# Patient Record
Sex: MALE | Race: WHITE | NOT HISPANIC OR LATINO | URBAN - METROPOLITAN AREA
[De-identification: names, ages, dates, MRNs, and addresses within clinical notes are randomized per-mention and may not be internally consistent; named-entity substitution may affect disease eponyms.]

---

## 2017-02-14 ENCOUNTER — INPATIENT HOSPITAL (OUTPATIENT)
Dept: URBAN - METROPOLITAN AREA HOSPITAL 76 | Facility: HOSPITAL | Age: 52
End: 2017-02-14

## 2017-02-14 DIAGNOSIS — F10.10 ALCOHOL ABUSE, UNCOMPLICATED: ICD-10-CM

## 2017-02-14 DIAGNOSIS — R94.5 ABNORMAL RESULTS OF LIVER FUNCTION STUDIES: ICD-10-CM

## 2017-02-14 PROCEDURE — 99253 IP/OBS CNSLTJ NEW/EST LOW 45: CPT | Performed by: NURSE PRACTITIONER

## 2017-02-15 PROCEDURE — 99231 SBSQ HOSP IP/OBS SF/LOW 25: CPT | Performed by: NURSE PRACTITIONER

## 2018-09-27 ENCOUNTER — INPATIENT HOSPITAL (OUTPATIENT)
Dept: URBAN - METROPOLITAN AREA HOSPITAL 84 | Facility: HOSPITAL | Age: 53
End: 2018-09-27

## 2018-09-27 DIAGNOSIS — R11.0 NAUSEA: ICD-10-CM

## 2018-09-27 DIAGNOSIS — I21.4 NON-ST ELEVATION (NSTEMI) MYOCARDIAL INFARCTION: ICD-10-CM

## 2018-09-27 DIAGNOSIS — K59.00 CONSTIPATION, UNSPECIFIED: ICD-10-CM

## 2018-09-27 DIAGNOSIS — F10.21 ALCOHOL DEPENDENCE, IN REMISSION: ICD-10-CM

## 2018-09-27 PROCEDURE — 99222 1ST HOSP IP/OBS MODERATE 55: CPT | Performed by: NURSE PRACTITIONER

## 2018-10-22 ENCOUNTER — HOSPITAL ENCOUNTER (OUTPATIENT)
Dept: PREADMISSION TESTING | Facility: HOSPITAL | Age: 53
Discharge: HOME | End: 2018-10-22
Attending: INTERNAL MEDICINE | Admitting: INTERNAL MEDICINE

## 2018-10-22 LAB
ALBUMIN SERPL-MCNC: 4.1 G/DL (ref 3.5–4.8)
ALBUMIN/GLOB SERPL: 1.3 {RATIO} (ref 1–1.7)
ALP SERPL-CCNC: 84 IU/L (ref 32–91)
ALT SERPL-CCNC: 30 IU/L (ref 17–63)
ANION GAP SERPL CALC-SCNC: 12.8 MMOL/L (ref 10–20)
AST SERPL-CCNC: 21 IU/L (ref 15–41)
BASOPHILS # BLD AUTO: 0.1 10*3/UL (ref 0–0.2)
BASOPHILS NFR BLD AUTO: 1 % (ref 0–2)
BILIRUB SERPL-MCNC: 0.7 MG/DL (ref 0.3–1.2)
BUN SERPL-MCNC: 13 MG/DL (ref 8–20)
BUN/CREAT SERPL: 14.4 (ref 6.2–20.3)
CALCIUM SERPL-MCNC: 9.4 MG/DL (ref 8.9–10.3)
CHLORIDE SERPL-SCNC: 101 MMOL/L (ref 101–111)
CONV CO2: 29 MMOL/L (ref 22–32)
CONV TOTAL PROTEIN: 7.3 G/DL (ref 6.1–7.9)
CREAT UR-MCNC: 0.9 MG/DL (ref 0.7–1.2)
DIFFERENTIAL METHOD BLD: (no result)
EOSINOPHIL # BLD AUTO: 0.2 10*3/UL (ref 0–0.3)
EOSINOPHIL # BLD AUTO: 3 % (ref 0–3)
ERYTHROCYTE [DISTWIDTH] IN BLOOD BY AUTOMATED COUNT: 12.9 % (ref 11.5–14.5)
GLOBULIN UR ELPH-MCNC: 3.2 G/DL (ref 2.5–3.8)
GLUCOSE SERPL-MCNC: 98 MG/DL (ref 65–99)
HCT VFR BLD AUTO: 43.6 % (ref 40–54)
HGB BLD-MCNC: 14.9 G/DL (ref 14–18)
INR PPP: 1.3 (ref 2–3)
LYMPHOCYTES # BLD AUTO: 2.8 10*3/UL (ref 0.8–4.8)
LYMPHOCYTES NFR BLD AUTO: 32 % (ref 18–42)
MCH RBC QN AUTO: 31.8 PG (ref 26–32)
MCHC RBC AUTO-ENTMCNC: 34.2 G/DL (ref 32–36)
MCV RBC AUTO: 92.8 FL (ref 80–94)
MONOCYTES # BLD AUTO: 1.1 10*3/UL (ref 0.1–1.3)
MONOCYTES NFR BLD AUTO: 12 % (ref 2–11)
NEUTROPHILS # BLD AUTO: 4.7 10*3/UL (ref 2.3–8.6)
NEUTROPHILS NFR BLD AUTO: 52 % (ref 50–75)
NRBC BLD AUTO-RTO: 0 /100{WBCS}
NRBC/RBC NFR BLD MANUAL: 0 10*3/UL
PLATELET # BLD AUTO: 306 10*3/UL (ref 150–450)
PMV BLD AUTO: 7.6 FL (ref 7.4–10.4)
POTASSIUM SERPL-SCNC: 4.8 MMOL/L (ref 3.6–5.1)
PROTHROMBIN TIME: 13 SEC (ref 19.4–28.5)
RBC # BLD AUTO: 4.7 10*6/UL (ref 4.6–6)
SODIUM SERPL-SCNC: 138 MMOL/L (ref 136–144)
WBC # BLD AUTO: 8.8 10*3/UL (ref 4.5–11.5)

## 2019-06-18 RX ORDER — WARFARIN SODIUM 5 MG/1
TABLET ORAL
Qty: 40 TABLET | Refills: 2 | Status: SHIPPED | OUTPATIENT
Start: 2019-06-18 | End: 2019-06-27

## 2019-06-27 ENCOUNTER — OFFICE VISIT (OUTPATIENT)
Dept: CARDIOLOGY | Facility: CLINIC | Age: 54
End: 2019-06-27

## 2019-06-27 ENCOUNTER — ANTICOAGULATION VISIT (OUTPATIENT)
Dept: CARDIOLOGY | Facility: CLINIC | Age: 54
End: 2019-06-27

## 2019-06-27 VITALS — DIASTOLIC BLOOD PRESSURE: 86 MMHG | WEIGHT: 230 LBS | SYSTOLIC BLOOD PRESSURE: 130 MMHG | HEART RATE: 62 BPM

## 2019-06-27 DIAGNOSIS — R42 DIZZY SPELLS: ICD-10-CM

## 2019-06-27 DIAGNOSIS — I71.40 AAA (ABDOMINAL AORTIC ANEURYSM) WITHOUT RUPTURE (HCC): ICD-10-CM

## 2019-06-27 DIAGNOSIS — R00.2 PALPITATIONS: Primary | ICD-10-CM

## 2019-06-27 DIAGNOSIS — Z79.01 LONG TERM CURRENT USE OF ANTICOAGULANT THERAPY: ICD-10-CM

## 2019-06-27 DIAGNOSIS — I10 ESSENTIAL HYPERTENSION: ICD-10-CM

## 2019-06-27 DIAGNOSIS — E78.5 HYPERLIPIDEMIA, UNSPECIFIED HYPERLIPIDEMIA TYPE: ICD-10-CM

## 2019-06-27 DIAGNOSIS — I25.41 ANEURYSM (ARTERIOVENOUS) OF CORONARY VESSELS: Primary | ICD-10-CM

## 2019-06-27 DIAGNOSIS — I25.2 HISTORY OF MYOCARDIAL INFARCTION: ICD-10-CM

## 2019-06-27 DIAGNOSIS — I25.118 CORONARY ARTERY DISEASE OF NATIVE HEART WITH STABLE ANGINA PECTORIS, UNSPECIFIED VESSEL OR LESION TYPE (HCC): ICD-10-CM

## 2019-06-27 LAB — INR PPP: 2.4 (ref 0.9–1.1)

## 2019-06-27 PROCEDURE — 85610 PROTHROMBIN TIME: CPT | Performed by: NURSE PRACTITIONER

## 2019-06-27 PROCEDURE — 36416 COLLJ CAPILLARY BLOOD SPEC: CPT | Performed by: NURSE PRACTITIONER

## 2019-06-27 PROCEDURE — 99213 OFFICE O/P EST LOW 20 MIN: CPT | Performed by: NURSE PRACTITIONER

## 2019-06-27 RX ORDER — WARFARIN SODIUM 5 MG/1
TABLET ORAL
COMMUNITY
Start: 2018-10-26 | End: 2019-09-19 | Stop reason: SDUPTHER

## 2019-06-27 RX ORDER — ATORVASTATIN CALCIUM 20 MG/1
TABLET, FILM COATED ORAL
Refills: 0 | COMMUNITY
Start: 2019-03-29 | End: 2019-12-27

## 2019-06-27 RX ORDER — ASPIRIN 81 MG/1
TABLET ORAL EVERY 24 HOURS
COMMUNITY
Start: 2018-10-02

## 2019-06-27 RX ORDER — AMLODIPINE BESYLATE 10 MG/1
TABLET ORAL
Refills: 0 | COMMUNITY
Start: 2019-04-25 | End: 2019-07-30 | Stop reason: SDUPTHER

## 2019-07-02 PROBLEM — I21.9 MYOCARDIAL INFARCTION (HCC): Status: ACTIVE | Noted: 2018-10-02

## 2019-07-02 PROBLEM — E78.5 HYPERLIPIDEMIA: Status: ACTIVE | Noted: 2018-10-02

## 2019-07-02 PROBLEM — I25.2 HISTORY OF MYOCARDIAL INFARCTION: Status: ACTIVE | Noted: 2018-10-02

## 2019-07-02 PROBLEM — I10 HYPERTENSION: Status: ACTIVE | Noted: 2018-10-02

## 2019-07-02 PROBLEM — I25.10 CORONARY HEART DISEASE: Status: ACTIVE | Noted: 2018-10-02

## 2019-07-02 PROBLEM — Z79.01 LONG TERM CURRENT USE OF ANTICOAGULANT THERAPY: Status: ACTIVE | Noted: 2018-10-04

## 2019-07-02 PROCEDURE — 93000 ELECTROCARDIOGRAM COMPLETE: CPT | Performed by: NURSE PRACTITIONER

## 2019-07-23 DIAGNOSIS — R00.2 PALPITATIONS: ICD-10-CM

## 2019-07-23 DIAGNOSIS — I25.41 ANEURYSM (ARTERIOVENOUS) OF CORONARY VESSELS: Primary | ICD-10-CM

## 2019-07-23 DIAGNOSIS — R42 DIZZY SPELLS: ICD-10-CM

## 2019-07-23 DIAGNOSIS — I25.2 HISTORY OF MYOCARDIAL INFARCTION: ICD-10-CM

## 2019-07-30 RX ORDER — AMLODIPINE BESYLATE 10 MG/1
10 TABLET ORAL DAILY
Qty: 90 TABLET | Refills: 2 | Status: SHIPPED | OUTPATIENT
Start: 2019-07-30 | End: 2020-04-22

## 2019-08-06 ENCOUNTER — ANTICOAGULATION VISIT (OUTPATIENT)
Dept: CARDIOLOGY | Facility: CLINIC | Age: 54
End: 2019-08-06

## 2019-08-06 DIAGNOSIS — Z86.79: Primary | ICD-10-CM

## 2019-08-06 LAB — INR PPP: 2.5 (ref 0.9–1.1)

## 2019-08-06 PROCEDURE — 85610 PROTHROMBIN TIME: CPT | Performed by: INTERNAL MEDICINE

## 2019-08-06 PROCEDURE — 36416 COLLJ CAPILLARY BLOOD SPEC: CPT | Performed by: INTERNAL MEDICINE

## 2019-08-09 ENCOUNTER — APPOINTMENT (OUTPATIENT)
Dept: CARDIOLOGY | Facility: HOSPITAL | Age: 54
End: 2019-08-09

## 2019-09-03 ENCOUNTER — ANTICOAGULATION VISIT (OUTPATIENT)
Dept: CARDIOLOGY | Facility: CLINIC | Age: 54
End: 2019-09-03

## 2019-09-03 DIAGNOSIS — I48.91 ATRIAL FIBRILLATION, UNSPECIFIED TYPE (HCC): Primary | ICD-10-CM

## 2019-09-03 LAB — INR PPP: 1.9 (ref 0.9–1.1)

## 2019-09-03 PROCEDURE — 85610 PROTHROMBIN TIME: CPT | Performed by: INTERNAL MEDICINE

## 2019-09-03 PROCEDURE — 36416 COLLJ CAPILLARY BLOOD SPEC: CPT | Performed by: INTERNAL MEDICINE

## 2019-09-19 RX ORDER — WARFARIN SODIUM 5 MG/1
TABLET ORAL
Qty: 40 TABLET | Refills: 0 | Status: SHIPPED | OUTPATIENT
Start: 2019-09-19 | End: 2019-10-22 | Stop reason: SDUPTHER

## 2019-10-08 ENCOUNTER — ANTICOAGULATION VISIT (OUTPATIENT)
Dept: CARDIOLOGY | Facility: CLINIC | Age: 54
End: 2019-10-08

## 2019-10-08 DIAGNOSIS — I25.41 ANEURYSM OF CORONARY VESSELS: Primary | ICD-10-CM

## 2019-10-08 LAB — INR PPP: 2 (ref 0.9–1.1)

## 2019-10-08 PROCEDURE — 36416 COLLJ CAPILLARY BLOOD SPEC: CPT | Performed by: INTERNAL MEDICINE

## 2019-10-08 PROCEDURE — 85610 PROTHROMBIN TIME: CPT | Performed by: INTERNAL MEDICINE

## 2019-10-22 RX ORDER — WARFARIN SODIUM 5 MG/1
TABLET ORAL
Qty: 40 TABLET | Refills: 5 | Status: SHIPPED | OUTPATIENT
Start: 2019-10-22 | End: 2020-01-28 | Stop reason: DRUGHIGH

## 2019-11-05 ENCOUNTER — ANTICOAGULATION VISIT (OUTPATIENT)
Dept: CARDIOLOGY | Facility: CLINIC | Age: 54
End: 2019-11-05

## 2019-11-05 DIAGNOSIS — I25.41 ANEURYSM OF CORONARY VESSELS: Primary | ICD-10-CM

## 2019-11-05 LAB — INR PPP: 2.2 (ref 0.9–1.1)

## 2019-11-05 PROCEDURE — 85610 PROTHROMBIN TIME: CPT | Performed by: INTERNAL MEDICINE

## 2019-11-05 PROCEDURE — 36416 COLLJ CAPILLARY BLOOD SPEC: CPT | Performed by: INTERNAL MEDICINE

## 2019-12-03 ENCOUNTER — ANTICOAGULATION VISIT (OUTPATIENT)
Dept: CARDIOLOGY | Facility: CLINIC | Age: 54
End: 2019-12-03

## 2019-12-03 DIAGNOSIS — I25.41 ANEURYSM OF CORONARY VESSELS: Primary | ICD-10-CM

## 2019-12-03 LAB — INR PPP: 1.8 (ref 0.9–1.1)

## 2019-12-03 PROCEDURE — 36416 COLLJ CAPILLARY BLOOD SPEC: CPT | Performed by: INTERNAL MEDICINE

## 2019-12-03 PROCEDURE — 85610 PROTHROMBIN TIME: CPT | Performed by: INTERNAL MEDICINE

## 2019-12-27 RX ORDER — ATORVASTATIN CALCIUM 20 MG/1
TABLET, FILM COATED ORAL
Qty: 90 TABLET | Refills: 1 | Status: SHIPPED | OUTPATIENT
Start: 2019-12-27 | End: 2020-06-25

## 2019-12-31 ENCOUNTER — ANTICOAGULATION VISIT (OUTPATIENT)
Dept: CARDIOLOGY | Facility: CLINIC | Age: 54
End: 2019-12-31

## 2019-12-31 DIAGNOSIS — I25.41 ANEURYSM OF CORONARY VESSELS: Primary | ICD-10-CM

## 2019-12-31 LAB — INR PPP: 1.6 (ref 0.9–1.1)

## 2019-12-31 PROCEDURE — 85610 PROTHROMBIN TIME: CPT | Performed by: NURSE PRACTITIONER

## 2019-12-31 PROCEDURE — 36416 COLLJ CAPILLARY BLOOD SPEC: CPT | Performed by: NURSE PRACTITIONER

## 2020-01-28 ENCOUNTER — ANTICOAGULATION VISIT (OUTPATIENT)
Dept: CARDIOLOGY | Facility: CLINIC | Age: 55
End: 2020-01-28

## 2020-01-28 DIAGNOSIS — I25.41 ANEURYSM OF CORONARY VESSELS: Primary | ICD-10-CM

## 2020-01-28 LAB — INR PPP: 1.9 (ref 0.9–1.1)

## 2020-01-28 PROCEDURE — 36416 COLLJ CAPILLARY BLOOD SPEC: CPT | Performed by: INTERNAL MEDICINE

## 2020-01-28 PROCEDURE — 85610 PROTHROMBIN TIME: CPT | Performed by: INTERNAL MEDICINE

## 2020-01-28 RX ORDER — WARFARIN SODIUM 5 MG/1
TABLET ORAL
Qty: 135 TABLET | Refills: 1 | Status: SHIPPED | OUTPATIENT
Start: 2020-01-28 | End: 2020-08-18

## 2020-02-06 ENCOUNTER — OFFICE VISIT (OUTPATIENT)
Dept: CARDIOLOGY | Facility: CLINIC | Age: 55
End: 2020-02-06

## 2020-02-06 VITALS
OXYGEN SATURATION: 100 % | WEIGHT: 243 LBS | HEIGHT: 69 IN | BODY MASS INDEX: 35.99 KG/M2 | DIASTOLIC BLOOD PRESSURE: 90 MMHG | SYSTOLIC BLOOD PRESSURE: 144 MMHG | HEART RATE: 67 BPM

## 2020-02-06 DIAGNOSIS — E78.5 HYPERLIPIDEMIA, UNSPECIFIED HYPERLIPIDEMIA TYPE: ICD-10-CM

## 2020-02-06 DIAGNOSIS — R60.0 BILATERAL LEG EDEMA: ICD-10-CM

## 2020-02-06 DIAGNOSIS — I25.10 CORONARY ARTERY DISEASE INVOLVING NATIVE HEART WITHOUT ANGINA PECTORIS, UNSPECIFIED VESSEL OR LESION TYPE: ICD-10-CM

## 2020-02-06 DIAGNOSIS — I10 ESSENTIAL HYPERTENSION: ICD-10-CM

## 2020-02-06 DIAGNOSIS — R06.02 SHORTNESS OF BREATH: ICD-10-CM

## 2020-02-06 DIAGNOSIS — I25.41 ANEURYSM OF CORONARY VESSELS: ICD-10-CM

## 2020-02-06 DIAGNOSIS — R00.2 PALPITATIONS: Primary | ICD-10-CM

## 2020-02-06 DIAGNOSIS — I21.A1 TYPE 2 MYOCARDIAL INFARCTION (HCC): ICD-10-CM

## 2020-02-06 DIAGNOSIS — Z79.01 LONG TERM CURRENT USE OF ANTICOAGULANT THERAPY: ICD-10-CM

## 2020-02-06 PROCEDURE — 99214 OFFICE O/P EST MOD 30 MIN: CPT | Performed by: NURSE PRACTITIONER

## 2020-02-06 PROCEDURE — 93000 ELECTROCARDIOGRAM COMPLETE: CPT | Performed by: NURSE PRACTITIONER

## 2020-02-06 NOTE — PROGRESS NOTES
"  [facility] CARDIOLOGY      REASON FOR FOLLOW-UP:  History of RCA dissection  Coagulopathy on warfarin  Hypertension  Dyslipidemia          Chief Complaint   Patient presents with   • Coronary Artery Disease     6 month f/u  Occasional dizziness.   • Hypertension         Dear        History of Present Illness   54-year-old  male with history of non ST-elevation myocardial infarction. Patient had spontaneous dissection of the RCA.  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  He was discharged home on Lovenox/warfarin.  He has additional risk factors that include hypertension, dyslipidemia and prior tobacco use.  At his last office visit, the patient reported that he was having symptoms of dizziness, occasional palpitations and at times he \"just does not feel right\".   He denieed any actual chest pains, pressure or tightness.  He denied any presyncopal or syncopal episodes.  He had some occasional, mild lower extremity edema.  He reported no shortness of air with exertion.    2D echo was ordered, however this was not approved by his insurance carrier.  Patient reports today that he continues to have these symptoms.  His blood pressure is elevated at 144/90.      Impression     1. Spontaneous dissection of right coronary artery   2. Non ST-elevation myocardial infarction   3. coagulopathy with warfarin  4. former ETOH abuse  5. Former tobacco abuse  6. history of hypertension  7. History of dyslipidemia     Plan  Continue current medications  We will get 2D echocardiogram order approved  Follow-up after testing      The following portions of the patient's history were reviewed and updated as appropriate: allergies, current medications, past family history, past medical history, past social history, past surgical history and problem list.    REVIEW OF SYSTEMS:    Review of Systems   Cardiovascular: Positive for leg swelling and palpitations.   Neurological: " Positive for dizziness.   All other systems reviewed and are negative.      Vitals:    02/06/20 1328   BP: 144/90   Pulse: 67   SpO2: 100%         PHYSICAL EXAM:    General: Alert, cooperative, no distress, appears stated age  Head:  Normocephalic, atraumatic, mucous membranes moist  Eyes:  Conjunctiva/corneas clear, EOM's intact     Neck:  Supple,  no JVD or bruit     Lungs: Clear to auscultation bilaterally, no wheezes rhonchi rales are noted  Chest wall: No tenderness  Musculoskeletal:   Ambulates freely without assistance  Heart::  Regular rate and rhythm, S1 and S2 normal, no murmur, rub or gallop  Abdomen: Soft, non-tender, nondistended bowel sounds active, no abdominal bruit  Extremities: No cyanosis, clubbing, or edema   Pulses: 2+ and symmetric all extremities  Skin:  No rashes or lesions  Neuro/psych: A&O x3. CN II through XII are grossly intact with appropriate affect        Past Medical History:   Diagnosis Date   • Chest pain 09/24/2018    Associated w/Nausea/SOB---Seen @ [facility] ER   • Coronary heart disease 09/2018   • Family history of heart disease     Mom/Dad/Uncles   • History of EKG 09/24/18-[facility] ER    T-Wave Inversion in Inferior Leads   • Hx of chest x-ray 09/24/18-[facility] ER    WNL w/Possible Atelectasis or Pneumonia   • Hyperlipidemia    • Hypertension    • Ischemic heart disease    • NSTEMI (non-ST elevated myocardial infarction) (CMS/[facility]) 09/24/2018       Past Surgical History:   Procedure Laterality Date   • CARDIAC CATHETERIZATION Left 09/4/18[facility]    w/Selective Coronary Arteriography/Intra-Aortic Balloon Pump Placement-Dr. Henderson--See Report   • LEG SURGERY      Right leg surgery steel plate-screws         Current Outpatient Medications:   •  amLODIPine (NORVASC) 10 MG tablet, Take 1 tablet by mouth Daily., Disp: 90 tablet, Rfl: 2  •  aspirin (ADULT ASPIRIN EC LOW STRENGTH) 81 MG EC tablet, Daily., Disp: , Rfl:   •  atorvastatin (LIPITOR) 20 MG tablet, TAKE ONE TABLET BY MOUTH DAILY, Disp: 90  tablet, Rfl: 1  •  metoprolol tartrate (LOPRESSOR) 25 MG tablet, Take 1 tablet by mouth Every 12 (Twelve) Hours. Take one-half tab twice daily, Disp: 180 tablet, Rfl: 1  •  warfarin (COUMADIN) 5 MG tablet, Take one & one-half tablets daily or as directed., Disp: 135 tablet, Rfl: 1    No Known Allergies    Family History   Problem Relation Age of Onset   • Heart disease Mother    • Heart disease Father    • Heart disease Paternal Uncle        Social History     Tobacco Use   • Smoking status: Former Smoker     Types: Cigarettes   • Smokeless tobacco: Never Used   Substance Use Topics   • Alcohol use: No           Current Electrocardiogram:    ECG 12 Lead  Date/Time: 2/6/2020 12:13 PM  Performed by: Roro Corley APRN  Authorized by: Roro Corley APRN   Comparison: compared with previous ECG   Comparison to previous ECG: First-degree AV block new from prior    Old anterior wall MI as per prior  Rhythm: sinus rhythm  Conduction: 1st degree AV block    Clinical impression: abnormal EKG              SAMIA Trujillo  02/10/20  12:15 PM

## 2020-02-10 PROBLEM — R00.2 PALPITATIONS: Status: ACTIVE | Noted: 2020-02-10

## 2020-02-18 ENCOUNTER — ANTICOAGULATION VISIT (OUTPATIENT)
Dept: CARDIOLOGY | Facility: CLINIC | Age: 55
End: 2020-02-18

## 2020-02-18 DIAGNOSIS — I25.41 ANEURYSM OF CORONARY VESSELS: Primary | ICD-10-CM

## 2020-02-18 LAB — INR PPP: 2 (ref 0.9–1.1)

## 2020-02-18 PROCEDURE — 85610 PROTHROMBIN TIME: CPT | Performed by: INTERNAL MEDICINE

## 2020-02-18 PROCEDURE — 36416 COLLJ CAPILLARY BLOOD SPEC: CPT | Performed by: INTERNAL MEDICINE

## 2020-03-17 ENCOUNTER — ANTICOAGULATION VISIT (OUTPATIENT)
Dept: CARDIOLOGY | Facility: CLINIC | Age: 55
End: 2020-03-17

## 2020-03-17 DIAGNOSIS — I25.41 ANEURYSM OF CORONARY VESSELS: Primary | ICD-10-CM

## 2020-03-17 LAB — INR PPP: 2.8 (ref 0.9–1.1)

## 2020-03-17 PROCEDURE — 36416 COLLJ CAPILLARY BLOOD SPEC: CPT | Performed by: INTERNAL MEDICINE

## 2020-03-17 PROCEDURE — 85610 PROTHROMBIN TIME: CPT | Performed by: INTERNAL MEDICINE

## 2020-04-14 ENCOUNTER — ANTICOAGULATION VISIT (OUTPATIENT)
Dept: CARDIOLOGY | Facility: CLINIC | Age: 55
End: 2020-04-14

## 2020-04-14 DIAGNOSIS — I25.41 ANEURYSM OF CORONARY VESSELS: Primary | ICD-10-CM

## 2020-04-14 LAB — INR PPP: 2.6 (ref 0.9–1.1)

## 2020-04-14 PROCEDURE — 36416 COLLJ CAPILLARY BLOOD SPEC: CPT | Performed by: INTERNAL MEDICINE

## 2020-04-14 PROCEDURE — 85610 PROTHROMBIN TIME: CPT | Performed by: INTERNAL MEDICINE

## 2020-04-22 RX ORDER — AMLODIPINE BESYLATE 10 MG/1
TABLET ORAL
Qty: 90 TABLET | Refills: 1 | Status: SHIPPED | OUTPATIENT
Start: 2020-04-22 | End: 2020-10-21

## 2020-05-12 ENCOUNTER — ANTICOAGULATION VISIT (OUTPATIENT)
Dept: CARDIOLOGY | Facility: CLINIC | Age: 55
End: 2020-05-12

## 2020-05-12 DIAGNOSIS — I25.41 ANEURYSM OF CORONARY VESSELS: Primary | ICD-10-CM

## 2020-05-12 LAB — INR PPP: 2.5 (ref 0.9–1.1)

## 2020-05-12 PROCEDURE — 36416 COLLJ CAPILLARY BLOOD SPEC: CPT | Performed by: INTERNAL MEDICINE

## 2020-05-12 PROCEDURE — 85610 PROTHROMBIN TIME: CPT | Performed by: INTERNAL MEDICINE

## 2020-06-09 ENCOUNTER — ANTICOAGULATION VISIT (OUTPATIENT)
Dept: CARDIOLOGY | Facility: CLINIC | Age: 55
End: 2020-06-09

## 2020-06-09 DIAGNOSIS — I25.41 ANEURYSM OF CORONARY VESSELS: Primary | ICD-10-CM

## 2020-06-09 LAB — INR PPP: 3.1 (ref 0.9–1.1)

## 2020-06-09 PROCEDURE — 85610 PROTHROMBIN TIME: CPT | Performed by: INTERNAL MEDICINE

## 2020-06-09 PROCEDURE — 36416 COLLJ CAPILLARY BLOOD SPEC: CPT | Performed by: INTERNAL MEDICINE

## 2020-06-25 RX ORDER — ATORVASTATIN CALCIUM 20 MG/1
TABLET, FILM COATED ORAL
Qty: 90 TABLET | Refills: 0 | Status: SHIPPED | OUTPATIENT
Start: 2020-06-25 | End: 2020-09-23

## 2020-07-07 ENCOUNTER — ANTICOAGULATION VISIT (OUTPATIENT)
Dept: CARDIOLOGY | Facility: CLINIC | Age: 55
End: 2020-07-07

## 2020-07-07 DIAGNOSIS — I25.41 ANEURYSM OF CORONARY VESSELS: Primary | ICD-10-CM

## 2020-07-07 LAB — INR PPP: 2 (ref 0.9–1.1)

## 2020-07-07 PROCEDURE — 36416 COLLJ CAPILLARY BLOOD SPEC: CPT | Performed by: INTERNAL MEDICINE

## 2020-07-07 PROCEDURE — 85610 PROTHROMBIN TIME: CPT | Performed by: INTERNAL MEDICINE

## 2020-08-04 ENCOUNTER — ANTICOAGULATION VISIT (OUTPATIENT)
Dept: CARDIOLOGY | Facility: CLINIC | Age: 55
End: 2020-08-04

## 2020-08-04 DIAGNOSIS — I25.41 ANEURYSM OF CORONARY VESSELS: Primary | ICD-10-CM

## 2020-08-04 LAB — INR PPP: 3.6 (ref 0.9–1.1)

## 2020-08-04 PROCEDURE — 36416 COLLJ CAPILLARY BLOOD SPEC: CPT | Performed by: INTERNAL MEDICINE

## 2020-08-04 PROCEDURE — 85610 PROTHROMBIN TIME: CPT | Performed by: INTERNAL MEDICINE

## 2020-08-06 ENCOUNTER — OFFICE VISIT (OUTPATIENT)
Dept: CARDIOLOGY | Facility: CLINIC | Age: 55
End: 2020-08-06

## 2020-08-06 VITALS
OXYGEN SATURATION: 96 % | BODY MASS INDEX: 34.26 KG/M2 | WEIGHT: 232 LBS | DIASTOLIC BLOOD PRESSURE: 88 MMHG | HEART RATE: 56 BPM | SYSTOLIC BLOOD PRESSURE: 133 MMHG

## 2020-08-06 DIAGNOSIS — I21.A1 TYPE 2 MYOCARDIAL INFARCTION (HCC): ICD-10-CM

## 2020-08-06 DIAGNOSIS — I25.10 CORONARY ARTERY DISEASE INVOLVING NATIVE CORONARY ARTERY OF NATIVE HEART WITHOUT ANGINA PECTORIS: Primary | ICD-10-CM

## 2020-08-06 DIAGNOSIS — E78.2 MIXED HYPERLIPIDEMIA: ICD-10-CM

## 2020-08-06 DIAGNOSIS — R00.2 PALPITATIONS: ICD-10-CM

## 2020-08-06 DIAGNOSIS — I10 ESSENTIAL HYPERTENSION: ICD-10-CM

## 2020-08-06 DIAGNOSIS — Z79.01 LONG TERM CURRENT USE OF ANTICOAGULANT THERAPY: ICD-10-CM

## 2020-08-06 PROCEDURE — 93000 ELECTROCARDIOGRAM COMPLETE: CPT | Performed by: NURSE PRACTITIONER

## 2020-08-06 PROCEDURE — 99214 OFFICE O/P EST MOD 30 MIN: CPT | Performed by: NURSE PRACTITIONER

## 2020-08-06 NOTE — PROGRESS NOTES
"  [facility] CARDIOLOGY      REASON FOR FOLLOW-UP:  History of RCA dissection  Coagulopathy on warfarin  Hypertension  Dyslipidemia          Chief Complaint   Patient presents with   • Coronary Artery Disease     6 mo f/u no cardiac complaints    • Hypertension   • Hyperlipidemia         History of Present Illness   55-year-old  male with history of non ST-elevation myocardial infarction secondary to dissection of the RCA.  Follow-up heart catheterization showed resolution of his aneurysmal RCA.  He was discharged home on Lovenox/warfarin.  He has additional risk factors that include hypertension, dyslipidemia and prior tobacco use.  At his last office visit, the patient reported that he was having symptoms of dizziness, occasional palpitations and at times he \"just does not feel right\".   He denied any actual chest pains, pressure or tightness.  He denied any presyncopal or syncopal episodes.  He had some occasional, mild lower extremity edema.  He reported no shortness of air with exertion.    2D echo was ordered, however this was not approved by his insurance carrier.    He presents today in follow-up for the above-mentioned diagnoses.    Today, patient reports that he feels well.  He does continue to feel tired due to working full-time and helping his parents at their home as well.  He denies any lower extremity edema, dyspnea with exertion.  He does report some mild intermittent dizziness without presyncopal or syncopal episodes.  EKG in the office today shows sinus bradycardia.  Blood pressure is well controlled at 133/88.      Assessment:  History of spontaneous dissection of the RCA  Non-ST elevation MI  Former EtOH abuse  Coagulopathy on warfarin  Dizziness  Hypertension  Dyslipidemia  Prior tobacco use    Plan:  I would still like to obtain echocardiogram.  We will send order again.  Discuss with cardiologist duration of anticoagulant therapy.    Due to aneurysmal artery " and slow arterial flow, patient will need life-long anticoagulation therapy.  Follow-up in 6 months or sooner if needed        The following portions of the patient's history were reviewed and updated as appropriate: allergies, current medications, past family history, past medical history, past social history, past surgical history and problem list.    REVIEW OF SYSTEMS:    Review of Systems   Constitution: Positive for malaise/fatigue.   Neurological: Positive for dizziness.   All other systems reviewed and are negative.      Vitals:    08/06/20 1318   BP: 133/88   Pulse: 56   SpO2: 96%         PHYSICAL EXAM:    General: Alert, cooperative, no distress, appears stated age  Head:  Normocephalic, atraumatic, mucous membranes moist  Eyes:  Conjunctiva/corneas clear, EOM's intact     Neck:  Supple,  no JVD or bruit     Lungs: Clear to auscultation bilaterally, no wheezes rhonchi rales are noted  Chest wall: No tenderness  Musculoskeletal:   Ambulates freely without assistance  Heart::  Regular rate and rhythm, S1 and S2 normal, no murmur, rub or gallop  Abdomen: Soft, non-tender, nondistended, bowel sounds active, no abdominal bruit  Extremities: No cyanosis, clubbing, or edema   Pulses: 2+ and symmetric all extremities  Skin:  No rashes or lesions  Neuro/psych: A&O x3. CN II through XII are grossly intact with appropriate affect        Past Medical History:   Diagnosis Date   • Chest pain 09/24/2018    Associated w/Nausea/SOB---Seen @ [facility] ER   • Coronary heart disease 09/2018   • Family history of heart disease     Mom/Dad/Uncles   • History of EKG 09/24/18-[facility] ER    T-Wave Inversion in Inferior Leads   • Hx of chest x-ray 09/24/18-[facility] ER    WNL w/Possible Atelectasis or Pneumonia   • Hyperlipidemia    • Hypertension    • Ischemic heart disease    • NSTEMI (non-ST elevated myocardial infarction) (CMS/[facility]) 09/24/2018       Past Surgical History:   Procedure Laterality Date   • CARDIAC CATHETERIZATION Left 09/4/18-[facility]  "   w/Selective Coronary Arteriography/Intra-Aortic Balloon Pump Placement-Dr. Henderson--See Report   • LEG SURGERY      Right leg surgery steel plate-screws         Current Outpatient Medications:   •  amLODIPine (NORVASC) 10 MG tablet, TAKE ONE TABLET BY MOUTH DAILY, Disp: 90 tablet, Rfl: 1  •  aspirin (ADULT ASPIRIN EC LOW STRENGTH) 81 MG EC tablet, Daily., Disp: , Rfl:   •  atorvastatin (LIPITOR) 20 MG tablet, TAKE ONE TABLET BY MOUTH DAILY, Disp: 90 tablet, Rfl: 0  •  metoprolol tartrate (LOPRESSOR) 25 MG tablet, TAKE ONE TABLET BY MOUTH EVERY 12 HOURS, Disp: 180 tablet, Rfl: 0  •  warfarin (COUMADIN) 5 MG tablet, Take one & one-half tablets daily or as directed., Disp: 135 tablet, Rfl: 1    No Known Allergies    Family History   Problem Relation Age of Onset   • Heart disease Mother    • Heart disease Father    • Heart disease Paternal Uncle        Social History     Tobacco Use   • Smoking status: Former Smoker     Types: Cigarettes   • Smokeless tobacco: Never Used   Substance Use Topics   • Alcohol use: No           Current Electrocardiogram:    ECG 12 Lead  Date/Time: 8/6/2020 3:10 PM  Performed by: Roro Corley APRN  Authorized by: Roro Corley APRN   Comparison: not compared with previous ECG   Rhythm: sinus bradycardia  BPM: 56  Q waves: V1 and V2                  SAMIA Trujillo  08/07/20  15:09      EMR Dragon/Transcription:   \"Dictated utilizing Dragon dictation\".         "

## 2020-08-07 DIAGNOSIS — I25.2 HISTORY OF MYOCARDIAL INFARCTION: ICD-10-CM

## 2020-08-07 DIAGNOSIS — I10 ESSENTIAL HYPERTENSION: ICD-10-CM

## 2020-08-07 DIAGNOSIS — I25.10 CORONARY ARTERY DISEASE INVOLVING NATIVE CORONARY ARTERY OF NATIVE HEART WITHOUT ANGINA PECTORIS: Primary | ICD-10-CM

## 2020-08-07 DIAGNOSIS — I25.42 CORONARY ARTERY DISSECTION: ICD-10-CM

## 2020-08-07 DIAGNOSIS — I21.A1 TYPE 2 MYOCARDIAL INFARCTION (HCC): ICD-10-CM

## 2020-08-18 RX ORDER — WARFARIN SODIUM 5 MG/1
TABLET ORAL
Qty: 135 TABLET | Refills: 0 | Status: SHIPPED | OUTPATIENT
Start: 2020-08-18 | End: 2020-11-13

## 2020-09-01 ENCOUNTER — ANTICOAGULATION VISIT (OUTPATIENT)
Dept: CARDIOLOGY | Facility: CLINIC | Age: 55
End: 2020-09-01

## 2020-09-01 DIAGNOSIS — I25.41 ANEURYSM OF CORONARY VESSELS: Primary | ICD-10-CM

## 2020-09-01 LAB — INR PPP: 2.7 (ref 0.9–1.1)

## 2020-09-01 PROCEDURE — 85610 PROTHROMBIN TIME: CPT | Performed by: INTERNAL MEDICINE

## 2020-09-01 PROCEDURE — 36416 COLLJ CAPILLARY BLOOD SPEC: CPT | Performed by: INTERNAL MEDICINE

## 2020-09-23 RX ORDER — ATORVASTATIN CALCIUM 20 MG/1
TABLET, FILM COATED ORAL
Qty: 90 TABLET | Refills: 0 | Status: SHIPPED | OUTPATIENT
Start: 2020-09-23 | End: 2020-12-21

## 2020-09-29 ENCOUNTER — ANTICOAGULATION VISIT (OUTPATIENT)
Dept: CARDIOLOGY | Facility: CLINIC | Age: 55
End: 2020-09-29

## 2020-09-29 DIAGNOSIS — I25.41 ANEURYSM OF CORONARY VESSELS: Primary | ICD-10-CM

## 2020-09-29 LAB — INR PPP: 2.1 (ref 0.9–1.1)

## 2020-09-29 PROCEDURE — 36416 COLLJ CAPILLARY BLOOD SPEC: CPT | Performed by: INTERNAL MEDICINE

## 2020-09-29 PROCEDURE — 85610 PROTHROMBIN TIME: CPT | Performed by: INTERNAL MEDICINE

## 2020-10-22 RX ORDER — AMLODIPINE BESYLATE 10 MG/1
TABLET ORAL
Qty: 90 TABLET | Refills: 3 | Status: SHIPPED | OUTPATIENT
Start: 2020-10-22 | End: 2021-10-18

## 2020-10-27 ENCOUNTER — ANTICOAGULATION VISIT (OUTPATIENT)
Dept: CARDIOLOGY | Facility: CLINIC | Age: 55
End: 2020-10-27

## 2020-10-27 DIAGNOSIS — I25.41 ANEURYSM OF CORONARY VESSELS: Primary | ICD-10-CM

## 2020-10-27 LAB — INR PPP: 1.7 (ref 0.9–1.1)

## 2020-10-27 PROCEDURE — 36416 COLLJ CAPILLARY BLOOD SPEC: CPT | Performed by: INTERNAL MEDICINE

## 2020-10-27 PROCEDURE — 85610 PROTHROMBIN TIME: CPT | Performed by: INTERNAL MEDICINE

## 2020-11-13 RX ORDER — WARFARIN SODIUM 5 MG/1
TABLET ORAL
Qty: 135 TABLET | Refills: 0 | Status: SHIPPED | OUTPATIENT
Start: 2020-11-13 | End: 2021-02-01

## 2020-11-17 ENCOUNTER — ANTICOAGULATION VISIT (OUTPATIENT)
Dept: CARDIOLOGY | Facility: CLINIC | Age: 55
End: 2020-11-17

## 2020-11-17 DIAGNOSIS — I25.41 ANEURYSM OF CORONARY VESSELS: Primary | ICD-10-CM

## 2020-11-17 LAB — INR PPP: 1.7 (ref 0.9–1.1)

## 2020-11-17 PROCEDURE — 85610 PROTHROMBIN TIME: CPT | Performed by: INTERNAL MEDICINE

## 2020-11-17 PROCEDURE — 36416 COLLJ CAPILLARY BLOOD SPEC: CPT | Performed by: INTERNAL MEDICINE

## 2020-12-08 ENCOUNTER — ANTICOAGULATION VISIT (OUTPATIENT)
Dept: CARDIOLOGY | Facility: CLINIC | Age: 55
End: 2020-12-08

## 2020-12-08 DIAGNOSIS — I25.41 ANEURYSM OF CORONARY VESSELS: Primary | ICD-10-CM

## 2020-12-08 LAB — INR PPP: 1.9 (ref 0.9–1.1)

## 2020-12-08 PROCEDURE — 85610 PROTHROMBIN TIME: CPT | Performed by: INTERNAL MEDICINE

## 2020-12-08 PROCEDURE — 36416 COLLJ CAPILLARY BLOOD SPEC: CPT | Performed by: INTERNAL MEDICINE

## 2020-12-21 RX ORDER — ATORVASTATIN CALCIUM 20 MG/1
TABLET, FILM COATED ORAL
Qty: 90 TABLET | Refills: 0 | Status: SHIPPED | OUTPATIENT
Start: 2020-12-21 | End: 2021-03-22

## 2021-01-05 ENCOUNTER — ANTICOAGULATION VISIT (OUTPATIENT)
Dept: CARDIOLOGY | Facility: CLINIC | Age: 56
End: 2021-01-05

## 2021-01-05 DIAGNOSIS — I25.41 ANEURYSM OF CORONARY VESSELS: Primary | ICD-10-CM

## 2021-01-05 LAB — INR PPP: 1.8 (ref 0.9–1.1)

## 2021-01-05 PROCEDURE — 85610 PROTHROMBIN TIME: CPT | Performed by: INTERNAL MEDICINE

## 2021-01-05 PROCEDURE — 36416 COLLJ CAPILLARY BLOOD SPEC: CPT | Performed by: INTERNAL MEDICINE

## 2021-01-26 ENCOUNTER — ANTICOAGULATION VISIT (OUTPATIENT)
Dept: CARDIOLOGY | Facility: CLINIC | Age: 56
End: 2021-01-26

## 2021-01-26 DIAGNOSIS — I25.41 ANEURYSM OF CORONARY VESSELS: Primary | ICD-10-CM

## 2021-01-26 LAB — INR PPP: 2.3 (ref 0.9–1.1)

## 2021-01-26 PROCEDURE — 85610 PROTHROMBIN TIME: CPT | Performed by: INTERNAL MEDICINE

## 2021-01-26 PROCEDURE — 36416 COLLJ CAPILLARY BLOOD SPEC: CPT | Performed by: INTERNAL MEDICINE

## 2021-02-01 RX ORDER — WARFARIN SODIUM 5 MG/1
TABLET ORAL
Qty: 135 TABLET | Refills: 0 | Status: SHIPPED | OUTPATIENT
Start: 2021-02-01 | End: 2021-04-13

## 2021-02-08 ENCOUNTER — OFFICE VISIT (OUTPATIENT)
Dept: CARDIOLOGY | Facility: CLINIC | Age: 56
End: 2021-02-08

## 2021-02-08 VITALS
SYSTOLIC BLOOD PRESSURE: 132 MMHG | DIASTOLIC BLOOD PRESSURE: 86 MMHG | BODY MASS INDEX: 20.82 KG/M2 | HEART RATE: 66 BPM | WEIGHT: 141 LBS

## 2021-02-08 DIAGNOSIS — I25.10 CORONARY ARTERY DISEASE INVOLVING NATIVE CORONARY ARTERY OF NATIVE HEART WITHOUT ANGINA PECTORIS: ICD-10-CM

## 2021-02-08 DIAGNOSIS — Z79.01 LONG TERM CURRENT USE OF ANTICOAGULANT THERAPY: ICD-10-CM

## 2021-02-08 DIAGNOSIS — I25.42 SPONTANEOUS DISSECTION OF CORONARY ARTERY: Primary | ICD-10-CM

## 2021-02-08 DIAGNOSIS — I21.A1 TYPE 2 MYOCARDIAL INFARCTION (HCC): ICD-10-CM

## 2021-02-08 DIAGNOSIS — E78.2 MIXED HYPERLIPIDEMIA: ICD-10-CM

## 2021-02-08 DIAGNOSIS — I10 ESSENTIAL HYPERTENSION: ICD-10-CM

## 2021-02-08 DIAGNOSIS — I25.2 HISTORY OF MYOCARDIAL INFARCTION: ICD-10-CM

## 2021-02-08 PROCEDURE — 99213 OFFICE O/P EST LOW 20 MIN: CPT | Performed by: NURSE PRACTITIONER

## 2021-02-08 PROCEDURE — 93000 ELECTROCARDIOGRAM COMPLETE: CPT | Performed by: NURSE PRACTITIONER

## 2021-02-08 NOTE — PROGRESS NOTES
"  [facility] CARDIOLOGY      REASON FOR FOLLOW-UP:            Chief Complaint   Patient presents with   • Coronary Artery Disease     6 mo f/u  no cardiac complaints   • Hypertension   • Hyperlipidemia             History of Present Illness     55-year-old  male with history of non ST-elevation myocardial infarction secondary to dissection of the RCA.  Follow-up heart catheterization showed resolution of his aneurysmal RCA.  He was discharged home on Lovenox/warfarin.  He has additional risk factors that include hypertension, dyslipidemia and prior tobacco use.  At his last office visit, the patient reported that he was having symptoms of dizziness, occasional palpitations and at times he \"just does not feel right\".   He denied any actual chest pains, pressure or tightness.  He denied any presyncopal or syncopal episodes.  He had some occasional, mild lower extremity edema.  He reported no shortness of air with exertion.    2D echo was ordered, however this was not approved by his insurance carrier.    He presents today in follow-up for the above-mentioned diagnoses.     Today, patient reports that he feels well.  He denies any chest pain, pressure or tightness, he denies any lower extremity edema, dyspnea with exertion.  He does report some ongoing mild intermittent dizziness without presyncopal or syncopal episodes.  EKG in the office today shows sinus rhythm with first-degree AV block.  Blood pressure is well controlled at 132/86.    Assessment:  History of spontaneous dissection of the RCA  Non-ST elevation MI  Former EtOH abuse  Coagulopathy on warfarin  Dizziness  Hypertension  Dyslipidemia  Prior tobacco use        Plan:    Due to aneurysmal artery and slow arterial flow, patient will need life-long anticoagulation therapy.  Patient needs to establish primary care  Lipids  Follow-up in 6 months or sooner if needed        The following portions of the patient's history were " reviewed and updated as appropriate: allergies, current medications, past family history, past medical history, past social history, past surgical history and problem list.    REVIEW OF SYSTEMS:    Review of Systems   Neurological: Positive for dizziness.   All other systems reviewed and are negative.      Vitals:    02/08/21 1259   BP: 132/86   Pulse: 66         PHYSICAL EXAM:    General: Alert, cooperative, no distress, appears stated age  Head:  Normocephalic, atraumatic, mucous membranes moist  Eyes:  Conjunctiva/corneas clear, EOM's intact     Neck:  Supple,  no JVD or bruit     Lungs: Clear to auscultation bilaterally, no wheezes rhonchi rales are noted  Chest wall: No tenderness  Musculoskeletal:   Ambulates freely without assistance  Heart::  Regular rate and rhythm, S1 and S2 normal, no murmur, rub or gallop  Abdomen: Soft, non-tender, nondistended, bowel sounds active, no abdominal bruit  Extremities: No cyanosis, clubbing, or edema   Pulses: 2+ and symmetric all extremities  Skin:  No rashes or lesions  Neuro/psych: A&O x3. CN II through XII are grossly intact with appropriate affect        Past Medical History:   Diagnosis Date   • Chest pain 09/24/2018    Associated w/Nausea/SOB---Seen @ [facility] ER   • Coronary heart disease 09/2018   • Family history of heart disease     Mom/Dad/Uncles   • History of EKG 09/24/18-[facility] ER    T-Wave Inversion in Inferior Leads   • Hx of chest x-ray 09/24/18-[facility] ER    WNL w/Possible Atelectasis or Pneumonia   • Hyperlipidemia    • Hypertension    • Ischemic heart disease    • NSTEMI (non-ST elevated myocardial infarction) (CMS/[facility]) 09/24/2018       Past Surgical History:   Procedure Laterality Date   • CARDIAC CATHETERIZATION Left 09/4/18-[facility]    w/Selective Coronary Arteriography/Intra-Aortic Balloon Pump Placement-Dr. Henderson--See Report   • LEG SURGERY      Right leg surgery steel plate-screws         Current Outpatient Medications:   •  amLODIPine (NORVASC) 10 MG tablet,  "TAKE ONE TABLET BY MOUTH DAILY, Disp: 90 tablet, Rfl: 3  •  aspirin (ADULT ASPIRIN EC LOW STRENGTH) 81 MG EC tablet, Daily., Disp: , Rfl:   •  atorvastatin (LIPITOR) 20 MG tablet, TAKE ONE TABLET BY MOUTH DAILY, Disp: 90 tablet, Rfl: 0  •  metoprolol tartrate (LOPRESSOR) 25 MG tablet, TAKE ONE TABLET BY MOUTH EVERY 12 HOURS, Disp: 180 tablet, Rfl: 0  •  warfarin (COUMADIN) 5 MG tablet, TAKE ONE AND ONE-HALF TABLETS BY MOUTH DAILY, Disp: 135 tablet, Rfl: 0    No Known Allergies    Family History   Problem Relation Age of Onset   • Heart disease Mother    • Heart disease Father    • Heart disease Paternal Uncle        Social History     Tobacco Use   • Smoking status: Former Smoker     Types: Cigarettes   • Smokeless tobacco: Never Used   Substance Use Topics   • Alcohol use: No           Current Electrocardiogram:    ECG 12 Lead    Date/Time: 2/8/2021 11:41 AM  Performed by: Roro Corley APRN  Authorized by: Roro Corley APRN   Comparison: not compared with previous ECG   Rhythm: sinus rhythm  BPM: 66  Conduction: 1st degree AV block                EMR Dragon/Transcription:   \"Dictated utilizing Dragon dictation\".     Disclaimer: Please note that areas of this note were completed with computer voice recognition software.  Quite often unanticipated grammatical, syntax, homophones, and other interpretive errors are inadvertently transcribed by the computer software. Please excuse any errors that have escaped final proofreading    "

## 2021-02-09 PROBLEM — I25.42 SPONTANEOUS DISSECTION OF CORONARY ARTERY: Status: ACTIVE | Noted: 2021-02-09

## 2021-02-23 ENCOUNTER — LAB (OUTPATIENT)
Dept: CARDIOLOGY | Facility: CLINIC | Age: 56
End: 2021-02-23

## 2021-02-23 ENCOUNTER — ANTICOAGULATION VISIT (OUTPATIENT)
Dept: CARDIOLOGY | Facility: CLINIC | Age: 56
End: 2021-02-23

## 2021-02-23 DIAGNOSIS — I25.41 ANEURYSM OF CORONARY VESSELS: Primary | ICD-10-CM

## 2021-02-23 DIAGNOSIS — E78.5 DYSLIPIDEMIA: Primary | ICD-10-CM

## 2021-02-23 LAB
CHOLEST SERPL-MCNC: 127 MG/DL (ref 0–200)
HDLC SERPL QL: 2.1
HDLC SERPL-MCNC: 60 MG/DL (ref 40–60)
INR PPP: 3.1 (ref 0.9–1.1)
LDLC SERPL CALC-MCNC: 53 MG/DL (ref 0–100)
TRIGL SERPL-MCNC: 74 MG/DL (ref 0–150)

## 2021-02-23 PROCEDURE — 36416 COLLJ CAPILLARY BLOOD SPEC: CPT | Performed by: INTERNAL MEDICINE

## 2021-02-23 PROCEDURE — 80061 LIPID PANEL: CPT | Performed by: NURSE PRACTITIONER

## 2021-02-23 PROCEDURE — 85610 PROTHROMBIN TIME: CPT | Performed by: INTERNAL MEDICINE

## 2021-03-22 RX ORDER — ATORVASTATIN CALCIUM 20 MG/1
TABLET, FILM COATED ORAL
Qty: 90 TABLET | Refills: 0 | Status: SHIPPED | OUTPATIENT
Start: 2021-03-22 | End: 2021-06-21

## 2021-03-23 ENCOUNTER — ANTICOAGULATION VISIT (OUTPATIENT)
Dept: CARDIOLOGY | Facility: CLINIC | Age: 56
End: 2021-03-23

## 2021-03-23 DIAGNOSIS — I25.41 ANEURYSM OF CORONARY VESSELS: Primary | ICD-10-CM

## 2021-03-23 LAB — INR PPP: 3.3 (ref 0.9–1.1)

## 2021-03-23 PROCEDURE — 36416 COLLJ CAPILLARY BLOOD SPEC: CPT | Performed by: INTERNAL MEDICINE

## 2021-03-23 PROCEDURE — 85610 PROTHROMBIN TIME: CPT | Performed by: INTERNAL MEDICINE

## 2021-04-13 ENCOUNTER — ANTICOAGULATION VISIT (OUTPATIENT)
Dept: CARDIOLOGY | Facility: CLINIC | Age: 56
End: 2021-04-13

## 2021-04-13 DIAGNOSIS — I25.41 ANEURYSM OF CORONARY VESSELS: Primary | ICD-10-CM

## 2021-04-13 LAB — INR PPP: 3 (ref 0.9–1.1)

## 2021-04-13 PROCEDURE — 36416 COLLJ CAPILLARY BLOOD SPEC: CPT | Performed by: INTERNAL MEDICINE

## 2021-04-13 PROCEDURE — 85610 PROTHROMBIN TIME: CPT | Performed by: INTERNAL MEDICINE

## 2021-04-13 RX ORDER — WARFARIN SODIUM 5 MG/1
TABLET ORAL
Qty: 135 TABLET | Refills: 0 | Status: SHIPPED | OUTPATIENT
Start: 2021-04-13 | End: 2021-07-06

## 2021-05-11 ENCOUNTER — ANTICOAGULATION VISIT (OUTPATIENT)
Dept: CARDIOLOGY | Facility: CLINIC | Age: 56
End: 2021-05-11

## 2021-05-11 DIAGNOSIS — I25.41 ANEURYSM OF CORONARY VESSELS: Primary | ICD-10-CM

## 2021-05-11 LAB — INR PPP: 2.5 (ref 0.9–1.1)

## 2021-05-11 PROCEDURE — 85610 PROTHROMBIN TIME: CPT | Performed by: INTERNAL MEDICINE

## 2021-05-11 PROCEDURE — 36416 COLLJ CAPILLARY BLOOD SPEC: CPT | Performed by: INTERNAL MEDICINE

## 2021-06-08 ENCOUNTER — ANTICOAGULATION VISIT (OUTPATIENT)
Dept: CARDIOLOGY | Facility: CLINIC | Age: 56
End: 2021-06-08

## 2021-06-08 DIAGNOSIS — I25.41 ANEURYSM OF CORONARY VESSELS: Primary | ICD-10-CM

## 2021-06-08 LAB — INR PPP: 3 (ref 0.9–1.1)

## 2021-06-08 PROCEDURE — 36416 COLLJ CAPILLARY BLOOD SPEC: CPT | Performed by: INTERNAL MEDICINE

## 2021-06-08 PROCEDURE — 85610 PROTHROMBIN TIME: CPT | Performed by: INTERNAL MEDICINE

## 2021-06-21 RX ORDER — ATORVASTATIN CALCIUM 20 MG/1
TABLET, FILM COATED ORAL
Qty: 90 TABLET | Refills: 0 | Status: SHIPPED | OUTPATIENT
Start: 2021-06-21 | End: 2021-09-17

## 2021-06-29 ENCOUNTER — ANTICOAGULATION VISIT (OUTPATIENT)
Dept: CARDIOLOGY | Facility: CLINIC | Age: 56
End: 2021-06-29

## 2021-06-29 DIAGNOSIS — I25.41 ANEURYSM OF CORONARY VESSELS: Primary | ICD-10-CM

## 2021-06-29 LAB — INR PPP: 2.7 (ref 0.9–1.1)

## 2021-06-29 PROCEDURE — 85610 PROTHROMBIN TIME: CPT | Performed by: INTERNAL MEDICINE

## 2021-06-29 PROCEDURE — 36416 COLLJ CAPILLARY BLOOD SPEC: CPT | Performed by: INTERNAL MEDICINE

## 2021-07-06 RX ORDER — WARFARIN SODIUM 5 MG/1
TABLET ORAL
Qty: 150 TABLET | Refills: 1 | Status: SHIPPED | OUTPATIENT
Start: 2021-07-06 | End: 2022-01-14

## 2021-07-27 ENCOUNTER — ANTICOAGULATION VISIT (OUTPATIENT)
Dept: CARDIOLOGY | Facility: CLINIC | Age: 56
End: 2021-07-27

## 2021-07-27 DIAGNOSIS — I25.41 ANEURYSM OF CORONARY VESSELS: Primary | ICD-10-CM

## 2021-07-27 LAB — INR PPP: 2.8 (ref 0.9–1.1)

## 2021-07-27 PROCEDURE — 85610 PROTHROMBIN TIME: CPT | Performed by: INTERNAL MEDICINE

## 2021-07-27 PROCEDURE — 36416 COLLJ CAPILLARY BLOOD SPEC: CPT | Performed by: INTERNAL MEDICINE

## 2021-08-10 ENCOUNTER — OFFICE VISIT (OUTPATIENT)
Dept: CARDIOLOGY | Facility: CLINIC | Age: 56
End: 2021-08-10

## 2021-08-10 VITALS
DIASTOLIC BLOOD PRESSURE: 81 MMHG | WEIGHT: 249 LBS | SYSTOLIC BLOOD PRESSURE: 124 MMHG | BODY MASS INDEX: 36.77 KG/M2 | HEART RATE: 59 BPM | OXYGEN SATURATION: 97 %

## 2021-08-10 DIAGNOSIS — Z79.01 LONG TERM CURRENT USE OF ANTICOAGULANT THERAPY: ICD-10-CM

## 2021-08-10 DIAGNOSIS — E78.2 MIXED HYPERLIPIDEMIA: Primary | ICD-10-CM

## 2021-08-10 DIAGNOSIS — I25.42 SPONTANEOUS DISSECTION OF CORONARY ARTERY: ICD-10-CM

## 2021-08-10 DIAGNOSIS — I25.2 HISTORY OF MYOCARDIAL INFARCTION: ICD-10-CM

## 2021-08-10 DIAGNOSIS — I25.10 CORONARY ARTERY DISEASE INVOLVING NATIVE CORONARY ARTERY OF NATIVE HEART WITHOUT ANGINA PECTORIS: ICD-10-CM

## 2021-08-10 DIAGNOSIS — I10 ESSENTIAL HYPERTENSION: ICD-10-CM

## 2021-08-10 PROCEDURE — 93000 ELECTROCARDIOGRAM COMPLETE: CPT | Performed by: NURSE PRACTITIONER

## 2021-08-10 PROCEDURE — 99213 OFFICE O/P EST LOW 20 MIN: CPT | Performed by: NURSE PRACTITIONER

## 2021-08-10 NOTE — PROGRESS NOTES
[facility] CARDIOLOGY      REASON FOR FOLLOW-UP:  History of non-ST elevation MI  History of          Chief Complaint   Patient presents with   • Coronary Artery Disease     6 mth f/u          Dear Provider, No Known        History of Present Illness     56-year-old  male with history of non ST-elevation myocardial infarction secondary to dissection of the RCA.  Follow-up heart catheterization showed resolution of his aneurysmal RCA.  He was discharged home on Lovenox/warfarin.  He has additional risk factors that include hypertension, dyslipidemia and prior tobacco use.  He presents today in follow-up for these diagnoses.    Today, patient reports that he feels very well.  He denies any chest pain, pressure or tightness, he denies any lower extremity edema, dyspnea with exertion.  He denies any further dizziness, no presyncopal or syncopal episodes.  He denies any lower extremity edema, orthopnea or PND.  Recent INR within therapeutic range at 2.8.        ASSESSMENT:  History of spontaneous dissection of the RCA  Non-ST elevation MI  Former EtOH abuse  Coagulopathy on warfarin  Dizziness  Hypertension  Dyslipidemia  Prior tobacco use        PLAN:  Lipids with INR in February 2022  Continue current CV plan of care  Follow-up in 6 months or sooner if needed        The following portions of the patient's history were reviewed and updated as appropriate: allergies, current medications, past family history, past medical history, past social history, past surgical history and problem list.    REVIEW OF SYSTEMS:    Review of Systems   All other systems reviewed and are negative.      Vitals:    08/10/21 1313   BP: 124/81   Pulse: 59   SpO2: 97%         PHYSICAL EXAM:    General: Alert, cooperative, no distress, appears stated age  Head:  Normocephalic, atraumatic, mucous membranes moist  Eyes:  Conjunctiva/corneas clear, EOM's intact     Neck:  Supple,  no JVD or bruit     Lungs: Clear  to auscultation bilaterally, no wheezes rhonchi rales are noted  Chest wall: No tenderness  Musculoskeletal:   Ambulates freely without assistance  Heart::  Regular rate and rhythm, S1 and S2 normal, no murmur, rub or gallop  Abdomen: Soft, non-tender, nondistended, bowel sounds active, no abdominal bruit  Extremities: No cyanosis, clubbing, or edema   Pulses: 2+ and symmetric all extremities  Skin:  No rashes or lesions  Neuro/psych: A&O x3. CN II through XII are grossly intact with appropriate affect        Past Medical History:   Diagnosis Date   • Chest pain 09/24/2018    Associated w/Nausea/SOB---Seen @ [facility] ER   • Coronary heart disease 09/2018   • Family history of heart disease     Mom/Dad/Uncles   • History of EKG 09/24/18-[facility] ER    T-Wave Inversion in Inferior Leads   • Hx of chest x-ray 09/24/18-[facility] ER    WNL w/Possible Atelectasis or Pneumonia   • Hyperlipidemia    • Hypertension    • Ischemic heart disease    • NSTEMI (non-ST elevated myocardial infarction) (CMS/[facility]) 09/24/2018       Past Surgical History:   Procedure Laterality Date   • CARDIAC CATHETERIZATION Left 09/4/18-[facility]    w/Selective Coronary Arteriography/Intra-Aortic Balloon Pump Placement-Dr. Henderson--See Report   • LEG SURGERY      Right leg surgery steel plate-screws         Current Outpatient Medications:   •  amLODIPine (NORVASC) 10 MG tablet, TAKE ONE TABLET BY MOUTH DAILY, Disp: 90 tablet, Rfl: 3  •  aspirin (ADULT ASPIRIN EC LOW STRENGTH) 81 MG EC tablet, Daily., Disp: , Rfl:   •  atorvastatin (LIPITOR) 20 MG tablet, TAKE ONE TABLET BY MOUTH DAILY, Disp: 90 tablet, Rfl: 0  •  metoprolol tartrate (LOPRESSOR) 25 MG tablet, TAKE ONE TABLET BY MOUTH EVERY 12 HOURS (Patient taking differently: 25 mg Daily. Once daily), Disp: 180 tablet, Rfl: 0  •  warfarin (COUMADIN) 5 MG tablet, Take two tablets by mouth x 2 days/week (Wed/Sat), take one & one-half tablets x 5 days/week or as directed., Disp: 150 tablet, Rfl: 1    No Known  "Allergies    Family History   Problem Relation Age of Onset   • Heart disease Mother    • Heart disease Father    • Heart disease Paternal Uncle        Social History     Tobacco Use   • Smoking status: Former Smoker     Types: Cigarettes   • Smokeless tobacco: Never Used   Substance Use Topics   • Alcohol use: No           Current Electrocardiogram:    ECG 12 Lead    Date/Time: 8/10/2021 4:34 PM  Performed by: Roro Corley APRN  Authorized by: Roro Corley APRN   Comparison: not compared with previous ECG   Rhythm: sinus rhythm and sinus bradycardia  BPM: 59  Conduction: 1st degree AV block  Q waves: V1 and V2                    EMR Dragon/Transcription:   \"Dictated utilizing Dragon dictation\".       "

## 2021-08-24 ENCOUNTER — ANTICOAGULATION VISIT (OUTPATIENT)
Dept: CARDIOLOGY | Facility: CLINIC | Age: 56
End: 2021-08-24

## 2021-08-24 DIAGNOSIS — I25.41 ANEURYSM OF CORONARY VESSELS: Primary | ICD-10-CM

## 2021-08-24 LAB — INR PPP: 2.9 (ref 0.9–1.1)

## 2021-08-24 PROCEDURE — 85610 PROTHROMBIN TIME: CPT | Performed by: INTERNAL MEDICINE

## 2021-08-24 PROCEDURE — 36416 COLLJ CAPILLARY BLOOD SPEC: CPT | Performed by: INTERNAL MEDICINE

## 2021-09-17 RX ORDER — ATORVASTATIN CALCIUM 20 MG/1
TABLET, FILM COATED ORAL
Qty: 90 TABLET | Refills: 1 | Status: SHIPPED | OUTPATIENT
Start: 2021-09-17 | End: 2022-03-15

## 2021-09-21 ENCOUNTER — ANTICOAGULATION VISIT (OUTPATIENT)
Dept: CARDIOLOGY | Facility: CLINIC | Age: 56
End: 2021-09-21

## 2021-09-21 DIAGNOSIS — I25.41 ANEURYSM OF CORONARY VESSELS: Primary | ICD-10-CM

## 2021-09-21 LAB — INR PPP: 3.4 (ref 0.9–1.1)

## 2021-09-21 PROCEDURE — 36416 COLLJ CAPILLARY BLOOD SPEC: CPT | Performed by: INTERNAL MEDICINE

## 2021-09-21 PROCEDURE — 85610 PROTHROMBIN TIME: CPT | Performed by: INTERNAL MEDICINE

## 2021-10-17 DIAGNOSIS — I10 PRIMARY HYPERTENSION: Primary | ICD-10-CM

## 2021-10-18 RX ORDER — AMLODIPINE BESYLATE 10 MG/1
TABLET ORAL
Qty: 90 TABLET | Refills: 3 | Status: SHIPPED | OUTPATIENT
Start: 2021-10-18 | End: 2022-10-10

## 2021-10-19 ENCOUNTER — ANTICOAGULATION VISIT (OUTPATIENT)
Dept: CARDIOLOGY | Facility: CLINIC | Age: 56
End: 2021-10-19

## 2021-10-19 DIAGNOSIS — I25.41 ANEURYSM OF CORONARY VESSELS: Primary | ICD-10-CM

## 2021-10-19 LAB — INR PPP: 3.4 (ref 0.9–1.1)

## 2021-10-19 PROCEDURE — 36416 COLLJ CAPILLARY BLOOD SPEC: CPT | Performed by: INTERNAL MEDICINE

## 2021-10-19 PROCEDURE — 85610 PROTHROMBIN TIME: CPT | Performed by: INTERNAL MEDICINE

## 2021-11-09 ENCOUNTER — ANTICOAGULATION VISIT (OUTPATIENT)
Dept: CARDIOLOGY | Facility: CLINIC | Age: 56
End: 2021-11-09

## 2021-11-09 DIAGNOSIS — I25.41 ANEURYSM OF CORONARY VESSELS: Primary | ICD-10-CM

## 2021-11-09 LAB — INR PPP: 3.3 (ref 0.9–1.1)

## 2021-11-09 PROCEDURE — 85610 PROTHROMBIN TIME: CPT | Performed by: INTERNAL MEDICINE

## 2021-11-09 PROCEDURE — 36416 COLLJ CAPILLARY BLOOD SPEC: CPT | Performed by: INTERNAL MEDICINE

## 2021-11-30 ENCOUNTER — ANTICOAGULATION VISIT (OUTPATIENT)
Dept: CARDIOLOGY | Facility: CLINIC | Age: 56
End: 2021-11-30

## 2021-11-30 DIAGNOSIS — I25.41 ANEURYSM OF CORONARY VESSELS: Primary | ICD-10-CM

## 2021-11-30 LAB — INR PPP: 2.4 (ref 0.9–1.1)

## 2021-11-30 PROCEDURE — 85610 PROTHROMBIN TIME: CPT | Performed by: INTERNAL MEDICINE

## 2021-11-30 PROCEDURE — 36416 COLLJ CAPILLARY BLOOD SPEC: CPT | Performed by: INTERNAL MEDICINE

## 2021-12-28 ENCOUNTER — ANTICOAGULATION VISIT (OUTPATIENT)
Dept: CARDIOLOGY | Facility: CLINIC | Age: 56
End: 2021-12-28

## 2021-12-28 DIAGNOSIS — I25.41 ANEURYSM OF CORONARY VESSELS: Primary | ICD-10-CM

## 2021-12-28 LAB — INR PPP: 4.3 (ref 0.9–1.1)

## 2021-12-28 PROCEDURE — 85610 PROTHROMBIN TIME: CPT | Performed by: INTERNAL MEDICINE

## 2021-12-28 PROCEDURE — 36416 COLLJ CAPILLARY BLOOD SPEC: CPT | Performed by: INTERNAL MEDICINE

## 2021-12-28 PROCEDURE — 93793 ANTICOAG MGMT PT WARFARIN: CPT | Performed by: INTERNAL MEDICINE

## 2022-01-11 ENCOUNTER — ANTICOAGULATION VISIT (OUTPATIENT)
Dept: CARDIOLOGY | Facility: CLINIC | Age: 57
End: 2022-01-11

## 2022-01-11 DIAGNOSIS — I25.41 ANEURYSM OF CORONARY VESSELS: Primary | ICD-10-CM

## 2022-01-11 LAB — INR PPP: 2.5 (ref 0.9–1.1)

## 2022-01-11 PROCEDURE — 36416 COLLJ CAPILLARY BLOOD SPEC: CPT | Performed by: INTERNAL MEDICINE

## 2022-01-11 PROCEDURE — 85610 PROTHROMBIN TIME: CPT | Performed by: INTERNAL MEDICINE

## 2022-01-11 PROCEDURE — 93793 ANTICOAG MGMT PT WARFARIN: CPT | Performed by: INTERNAL MEDICINE

## 2022-01-14 RX ORDER — WARFARIN SODIUM 5 MG/1
TABLET ORAL
Qty: 150 TABLET | Refills: 1 | Status: SHIPPED | OUTPATIENT
Start: 2022-01-14 | End: 2022-08-15

## 2022-02-08 ENCOUNTER — ANTICOAGULATION VISIT (OUTPATIENT)
Dept: CARDIOLOGY | Facility: CLINIC | Age: 57
End: 2022-02-08

## 2022-02-08 DIAGNOSIS — I25.41 ANEURYSM OF CORONARY VESSELS: Primary | ICD-10-CM

## 2022-02-08 LAB — INR PPP: 2.9 (ref 0.9–1.1)

## 2022-02-08 PROCEDURE — 93793 ANTICOAG MGMT PT WARFARIN: CPT | Performed by: INTERNAL MEDICINE

## 2022-02-08 PROCEDURE — 85610 PROTHROMBIN TIME: CPT | Performed by: INTERNAL MEDICINE

## 2022-02-08 PROCEDURE — 36416 COLLJ CAPILLARY BLOOD SPEC: CPT | Performed by: INTERNAL MEDICINE

## 2022-02-10 ENCOUNTER — OFFICE VISIT (OUTPATIENT)
Dept: CARDIOLOGY | Facility: CLINIC | Age: 57
End: 2022-02-10

## 2022-02-10 VITALS
HEIGHT: 69 IN | WEIGHT: 250 LBS | DIASTOLIC BLOOD PRESSURE: 90 MMHG | OXYGEN SATURATION: 98 % | SYSTOLIC BLOOD PRESSURE: 137 MMHG | HEART RATE: 66 BPM | BODY MASS INDEX: 37.03 KG/M2

## 2022-02-10 DIAGNOSIS — I10 PRIMARY HYPERTENSION: ICD-10-CM

## 2022-02-10 DIAGNOSIS — I21.A1 TYPE 2 MYOCARDIAL INFARCTION: ICD-10-CM

## 2022-02-10 DIAGNOSIS — E78.2 MIXED HYPERLIPIDEMIA: ICD-10-CM

## 2022-02-10 DIAGNOSIS — I25.2 HISTORY OF MYOCARDIAL INFARCTION: ICD-10-CM

## 2022-02-10 DIAGNOSIS — I25.10 CORONARY ARTERY DISEASE INVOLVING NATIVE CORONARY ARTERY OF NATIVE HEART WITHOUT ANGINA PECTORIS: Primary | ICD-10-CM

## 2022-02-10 DIAGNOSIS — Z79.01 LONG TERM CURRENT USE OF ANTICOAGULANT THERAPY: ICD-10-CM

## 2022-02-10 DIAGNOSIS — I25.42 SPONTANEOUS DISSECTION OF CORONARY ARTERY: ICD-10-CM

## 2022-02-10 PROCEDURE — 99214 OFFICE O/P EST MOD 30 MIN: CPT | Performed by: NURSE PRACTITIONER

## 2022-02-10 PROCEDURE — 93000 ELECTROCARDIOGRAM COMPLETE: CPT | Performed by: NURSE PRACTITIONER

## 2022-02-10 NOTE — PROGRESS NOTES
[facility] CARDIOLOGY      REASON FOR FOLLOW-UP:  Coronary artery dissection  Dyslipidemia  Hypertension          Chief Complaint   Patient presents with   • Chest Pain     Coronary artery dissection         Dear Provider, No Known        History of Present Illness   56-year-old  male with history of non ST-elevation myocardial infarction secondary to dissection of the RCA.  Follow-up heart catheterization showed resolution of his aneurysmal RCA.  He was discharged home on Lovenox/warfarin.  He has additional risk factors that include hypertension, dyslipidemia and prior tobacco use.  He presents today in follow-up for these diagnoses.    Today, the patient reports no complaints of chest pain, pressure, tightness or palpitations.  He denies any shortness of breath at rest, dyspnea with exertion, orthopnea or PND.  Patient stated he does occasionally feel short of breath when wearing a facemask.  He wanted to discuss possible over-the-counter sleep aids.  He has difficulty falling asleep at night.  He denies any lower extremity edema, dizziness, lightheadedness, near syncopal or syncopal episodes.  He denies orthopnea or PND.  EKG in the office today shows normal sinus rhythm with first-degree AV block      ASSESSMENT:  History of spontaneous dissection of the RCA  Non-ST elevation MI  Former EtOH abuse  Coagulopathy on warfarin  Dizziness  Hypertension  Dyslipidemia  Prior tobacco use        PLAN:  Lipids, CBC, BMP at [place]  Continue current CV plan of care  Follow-up in 6 months or sooner if needed        The following portions of the patient's history were reviewed and updated as appropriate: allergies, current medications, past family history, past medical history, past social history, past surgical history and problem list.    REVIEW OF SYSTEMS:    Review of Systems   Psychiatric/Behavioral: The patient has insomnia.    All other systems reviewed and are negative.      Vitals:     02/10/22 1308   BP: 137/90   Pulse: 66   SpO2: 98%         PHYSICAL EXAM:    General: Alert, cooperative, no distress, appears stated age  Head:  Normocephalic, atraumatic, mucous membranes moist  Eyes:  Conjunctiva/corneas clear, EOM's intact     Neck:  Supple,  no JVD or bruit     Lungs: Clear to auscultation bilaterally, no wheezes rhonchi rales are noted  Chest wall: No tenderness  Musculoskeletal:   Ambulates freely without assistance  Heart::  Regular rate and rhythm, S1 and S2 normal, no murmur, rub or gallop  Abdomen: Soft, non-tender, nondistended, bowel sounds active, no abdominal bruit  Extremities: No cyanosis, clubbing, or edema   Pulses: 2+ and symmetric all extremities  Skin:  No rashes or lesions  Neuro/psych: A&O x3. CN II through XII are grossly intact with appropriate affect        Past Medical History:   Diagnosis Date   • Chest pain 09/24/2018    Associated w/Nausea/SOB---Seen @ [facility] ER   • Coronary heart disease 09/2018   • Family history of heart disease     Mom/Dad/Uncles   • History of EKG 09/24/18-[facility] ER    T-Wave Inversion in Inferior Leads   • Hx of chest x-ray 09/24/18[facility] ER    WNL w/Possible Atelectasis or Pneumonia   • Hyperlipidemia    • Hypertension    • Ischemic heart disease    • NSTEMI (non-ST elevated myocardial infarction) (HCC) 09/24/2018       Past Surgical History:   Procedure Laterality Date   • CARDIAC CATHETERIZATION Left 09/4/18[facility]    w/Selective Coronary Arteriography/Intra-Aortic Balloon Pump Placement-Dr. Henderson--See Report   • LEG SURGERY      Right leg surgery steel plate-screws         Current Outpatient Medications:   •  amLODIPine (NORVASC) 10 MG tablet, TAKE ONE TABLET BY MOUTH DAILY, Disp: 90 tablet, Rfl: 3  •  aspirin (ADULT ASPIRIN EC LOW STRENGTH) 81 MG EC tablet, Daily., Disp: , Rfl:   •  atorvastatin (LIPITOR) 20 MG tablet, TAKE ONE TABLET BY MOUTH DAILY, Disp: 90 tablet, Rfl: 1  •  metoprolol tartrate (LOPRESSOR) 25 MG tablet, TAKE ONE TABLET BY  "MOUTH EVERY 12 HOURS, Disp: 180 tablet, Rfl: 0  •  warfarin (COUMADIN) 5 MG tablet, TAKE 2 TABLETS BY MOUTH ON WEDNESDAY AND SATURDAY, TAKE 1 AND 1/2 TABLETS BY MOUTH ON ALL OTHER DAYS OF WEEK., Disp: 150 tablet, Rfl: 1    No Known Allergies    Family History   Problem Relation Age of Onset   • Heart disease Mother    • Heart disease Father    • Heart disease Paternal Uncle        Social History     Tobacco Use   • Smoking status: Former Smoker     Types: Cigarettes   • Smokeless tobacco: Never Used   Substance Use Topics   • Alcohol use: No           Current Electrocardiogram:    ECG 12 Lead    Date/Time: 2/10/2022 2:50 PM  Performed by: Roro Corley APRN  Authorized by: Roro Corley APRN   Comparison: not compared with previous ECG   Rhythm: sinus rhythm  BPM: 66  Conduction: 1st degree AV block  Q waves: V3                    EMR Dragon/Transcription:   \"Dictated utilizing Dragon dictation\".       "

## 2022-03-08 ENCOUNTER — ANTICOAGULATION VISIT (OUTPATIENT)
Dept: CARDIOLOGY | Facility: CLINIC | Age: 57
End: 2022-03-08

## 2022-03-08 ENCOUNTER — LAB (OUTPATIENT)
Dept: LAB | Facility: HOSPITAL | Age: 57
End: 2022-03-08

## 2022-03-08 DIAGNOSIS — I25.10 CORONARY ARTERY DISEASE INVOLVING NATIVE CORONARY ARTERY OF NATIVE HEART WITHOUT ANGINA PECTORIS: ICD-10-CM

## 2022-03-08 DIAGNOSIS — I25.41 ANEURYSM OF CORONARY VESSELS: Primary | ICD-10-CM

## 2022-03-08 DIAGNOSIS — E78.2 MIXED HYPERLIPIDEMIA: ICD-10-CM

## 2022-03-08 DIAGNOSIS — Z79.01 LONG TERM CURRENT USE OF ANTICOAGULANT THERAPY: ICD-10-CM

## 2022-03-08 DIAGNOSIS — I25.2 HISTORY OF MYOCARDIAL INFARCTION: ICD-10-CM

## 2022-03-08 LAB
ANION GAP SERPL CALCULATED.3IONS-SCNC: 10.6 MMOL/L (ref 5–15)
BUN SERPL-MCNC: 17 MG/DL (ref 6–20)
BUN/CREAT SERPL: 18.9 (ref 7–25)
CALCIUM SPEC-SCNC: 9 MG/DL (ref 8.6–10.5)
CHLORIDE SERPL-SCNC: 103 MMOL/L (ref 98–107)
CHOLEST SERPL-MCNC: 137 MG/DL (ref 0–200)
CO2 SERPL-SCNC: 24.4 MMOL/L (ref 22–29)
CREAT SERPL-MCNC: 0.9 MG/DL (ref 0.76–1.27)
DEPRECATED RDW RBC AUTO: 44.1 FL (ref 37–54)
EGFRCR SERPLBLD CKD-EPI 2021: 100.2 ML/MIN/1.73
ERYTHROCYTE [DISTWIDTH] IN BLOOD BY AUTOMATED COUNT: 13 % (ref 12.3–15.4)
GLUCOSE SERPL-MCNC: 104 MG/DL (ref 65–99)
HCT VFR BLD AUTO: 44.1 % (ref 37.5–51)
HDLC SERPL-MCNC: 49 MG/DL (ref 40–60)
HGB BLD-MCNC: 15 G/DL (ref 13–17.7)
INR PPP: 2.6 (ref 0.9–1.1)
LDLC SERPL CALC-MCNC: 74 MG/DL (ref 0–100)
LDLC/HDLC SERPL: 1.52 {RATIO}
MCH RBC QN AUTO: 31.4 PG (ref 26.6–33)
MCHC RBC AUTO-ENTMCNC: 34 G/DL (ref 31.5–35.7)
MCV RBC AUTO: 92.5 FL (ref 79–97)
PLATELET # BLD AUTO: 321 10*3/MM3 (ref 140–450)
PMV BLD AUTO: 10.2 FL (ref 6–12)
POTASSIUM SERPL-SCNC: 4.5 MMOL/L (ref 3.5–5.2)
RBC # BLD AUTO: 4.77 10*6/MM3 (ref 4.14–5.8)
SODIUM SERPL-SCNC: 138 MMOL/L (ref 136–145)
TRIGL SERPL-MCNC: 68 MG/DL (ref 0–150)
VLDLC SERPL-MCNC: 14 MG/DL (ref 5–40)
WBC NRBC COR # BLD: 7.57 10*3/MM3 (ref 3.4–10.8)

## 2022-03-08 PROCEDURE — 85027 COMPLETE CBC AUTOMATED: CPT

## 2022-03-08 PROCEDURE — 36416 COLLJ CAPILLARY BLOOD SPEC: CPT | Performed by: INTERNAL MEDICINE

## 2022-03-08 PROCEDURE — 80048 BASIC METABOLIC PNL TOTAL CA: CPT

## 2022-03-08 PROCEDURE — 93793 ANTICOAG MGMT PT WARFARIN: CPT | Performed by: INTERNAL MEDICINE

## 2022-03-08 PROCEDURE — 85610 PROTHROMBIN TIME: CPT | Performed by: INTERNAL MEDICINE

## 2022-03-08 PROCEDURE — 36415 COLL VENOUS BLD VENIPUNCTURE: CPT

## 2022-03-08 PROCEDURE — 80061 LIPID PANEL: CPT

## 2022-03-15 RX ORDER — ATORVASTATIN CALCIUM 20 MG/1
TABLET, FILM COATED ORAL
Qty: 90 TABLET | Refills: 1 | Status: SHIPPED | OUTPATIENT
Start: 2022-03-15 | End: 2022-09-12

## 2022-04-05 ENCOUNTER — ANTICOAGULATION VISIT (OUTPATIENT)
Dept: CARDIOLOGY | Facility: CLINIC | Age: 57
End: 2022-04-05

## 2022-04-05 DIAGNOSIS — I25.41 ANEURYSM OF CORONARY VESSELS: Primary | ICD-10-CM

## 2022-04-05 LAB — INR PPP: 2.2 (ref 0.9–1.1)

## 2022-04-05 PROCEDURE — 93793 ANTICOAG MGMT PT WARFARIN: CPT | Performed by: INTERNAL MEDICINE

## 2022-04-05 PROCEDURE — 36416 COLLJ CAPILLARY BLOOD SPEC: CPT | Performed by: INTERNAL MEDICINE

## 2022-04-05 PROCEDURE — 85610 PROTHROMBIN TIME: CPT | Performed by: INTERNAL MEDICINE

## 2022-05-03 ENCOUNTER — ANTICOAGULATION VISIT (OUTPATIENT)
Dept: CARDIOLOGY | Facility: CLINIC | Age: 57
End: 2022-05-03

## 2022-05-03 DIAGNOSIS — I25.41 ANEURYSM OF CORONARY VESSELS: Primary | ICD-10-CM

## 2022-05-03 LAB — INR PPP: 1.6 (ref 0.9–1.1)

## 2022-05-03 PROCEDURE — 36416 COLLJ CAPILLARY BLOOD SPEC: CPT | Performed by: INTERNAL MEDICINE

## 2022-05-03 PROCEDURE — 93793 ANTICOAG MGMT PT WARFARIN: CPT | Performed by: INTERNAL MEDICINE

## 2022-05-03 PROCEDURE — 85610 PROTHROMBIN TIME: CPT | Performed by: INTERNAL MEDICINE

## 2022-05-31 ENCOUNTER — ANTICOAGULATION VISIT (OUTPATIENT)
Dept: CARDIOLOGY | Facility: CLINIC | Age: 57
End: 2022-05-31

## 2022-05-31 DIAGNOSIS — I25.41 ANEURYSM OF CORONARY VESSELS: Primary | ICD-10-CM

## 2022-05-31 LAB — INR PPP: 2.1 (ref 0.9–1.1)

## 2022-05-31 PROCEDURE — 85610 PROTHROMBIN TIME: CPT | Performed by: INTERNAL MEDICINE

## 2022-05-31 PROCEDURE — 93793 ANTICOAG MGMT PT WARFARIN: CPT | Performed by: INTERNAL MEDICINE

## 2022-05-31 PROCEDURE — 36416 COLLJ CAPILLARY BLOOD SPEC: CPT | Performed by: INTERNAL MEDICINE

## 2022-07-05 ENCOUNTER — ANTICOAGULATION VISIT (OUTPATIENT)
Dept: CARDIOLOGY | Facility: CLINIC | Age: 57
End: 2022-07-05

## 2022-07-05 DIAGNOSIS — I25.41 ANEURYSM OF CORONARY VESSELS: Primary | ICD-10-CM

## 2022-07-05 LAB — INR PPP: 2.3 (ref 0.9–1.1)

## 2022-07-05 PROCEDURE — 36416 COLLJ CAPILLARY BLOOD SPEC: CPT | Performed by: INTERNAL MEDICINE

## 2022-07-05 PROCEDURE — 93793 ANTICOAG MGMT PT WARFARIN: CPT | Performed by: INTERNAL MEDICINE

## 2022-07-05 PROCEDURE — 85610 PROTHROMBIN TIME: CPT | Performed by: INTERNAL MEDICINE

## 2022-08-02 ENCOUNTER — ANTICOAGULATION VISIT (OUTPATIENT)
Dept: CARDIOLOGY | Facility: CLINIC | Age: 57
End: 2022-08-02

## 2022-08-02 DIAGNOSIS — I25.41 ANEURYSM OF CORONARY VESSELS: Primary | ICD-10-CM

## 2022-08-02 LAB — INR PPP: 2.4 (ref 0.9–1.1)

## 2022-08-02 PROCEDURE — 36416 COLLJ CAPILLARY BLOOD SPEC: CPT | Performed by: INTERNAL MEDICINE

## 2022-08-02 PROCEDURE — 93793 ANTICOAG MGMT PT WARFARIN: CPT | Performed by: INTERNAL MEDICINE

## 2022-08-02 PROCEDURE — 85610 PROTHROMBIN TIME: CPT | Performed by: INTERNAL MEDICINE

## 2022-08-15 RX ORDER — WARFARIN SODIUM 5 MG/1
TABLET ORAL
Qty: 150 TABLET | Refills: 1 | Status: SHIPPED | OUTPATIENT
Start: 2022-08-15 | End: 2023-02-13

## 2022-08-18 ENCOUNTER — OFFICE VISIT (OUTPATIENT)
Dept: CARDIOLOGY | Facility: CLINIC | Age: 57
End: 2022-08-18

## 2022-08-18 VITALS
HEART RATE: 69 BPM | WEIGHT: 203 LBS | BODY MASS INDEX: 30.07 KG/M2 | DIASTOLIC BLOOD PRESSURE: 84 MMHG | HEIGHT: 69 IN | SYSTOLIC BLOOD PRESSURE: 136 MMHG | OXYGEN SATURATION: 98 %

## 2022-08-18 DIAGNOSIS — I25.42 SPONTANEOUS DISSECTION OF CORONARY ARTERY: ICD-10-CM

## 2022-08-18 DIAGNOSIS — Z79.01 LONG TERM CURRENT USE OF ANTICOAGULANT THERAPY: ICD-10-CM

## 2022-08-18 DIAGNOSIS — I10 PRIMARY HYPERTENSION: ICD-10-CM

## 2022-08-18 DIAGNOSIS — I25.10 CORONARY ARTERY DISEASE INVOLVING NATIVE CORONARY ARTERY OF NATIVE HEART WITHOUT ANGINA PECTORIS: Primary | ICD-10-CM

## 2022-08-18 DIAGNOSIS — E78.2 HYPERLIPIDEMIA, MIXED: ICD-10-CM

## 2022-08-18 DIAGNOSIS — I25.2 HISTORY OF MYOCARDIAL INFARCTION: ICD-10-CM

## 2022-08-18 PROCEDURE — 99213 OFFICE O/P EST LOW 20 MIN: CPT | Performed by: NURSE PRACTITIONER

## 2022-08-18 PROCEDURE — 93000 ELECTROCARDIOGRAM COMPLETE: CPT | Performed by: NURSE PRACTITIONER

## 2022-08-18 NOTE — PROGRESS NOTES
[facility] CARDIOLOGY      REASON FOR FOLLOW-UP:  Follow-up non-STEMI with dissection of RCA          Chief Complaint   Patient presents with   • Follow-up   • Coronary Artery Disease   • Hyperlipidemia         Dear Patrick, Michael Shore MD        History of Present Illness   It was my pleasure to see Robin Prado in the office today.  He is a 57-year-old  male with history of non ST-elevation myocardial infarction secondary to dissection of the RCA September 2018.  Follow-up heart catheterization showed resolution of his aneurysmal RCA.  He was discharged home on warfarin therapy.  He has additional risk factors that include hypertension, dyslipidemia and prior tobacco use.  He presents today in follow-up for these diagnoses.    Patient is very happy to report today that he has completely changed his lifestyle, losing 50 pounds by decreasing soda intake, heart healthy diet and exercising.  Patient states he feels exceptionally well.  Labs reviewed from March 2022 showed lipids within normal range, CBC and BMP also adequate.  He denies any chest pain, pressure or tightness.  Denies any shortness of breath at rest, dyspnea with exertion, orthopnea or PND.  He has had no lower extremity edema.  His blood pressure is under excellent control at 136/84.  He appears to be doing quite well from a cardiovascular standpoint.      ASSESSMENT:  History of spontaneous dissection of the RCA  Non-ST elevation MI  Former EtOH abuse  Coagulopathy on warfarin  Dizziness  Hypertension  Dyslipidemia  Prior tobacco use        PLAN:  Continue lifestyle modifications including heart healthy diet, exercise.  Continue current CV plan of care  Surveillance labs at your discretion  Follow-up our office in 6 months or sooner if needed        The following portions of the patient's history were reviewed and updated as appropriate: allergies, current medications, past family history, past medical history,  past social history, past surgical history and problem list.    REVIEW OF SYSTEMS:    ROS    Vitals:    08/18/22 1316   BP: 136/84   Pulse: 69   SpO2: 98%         PHYSICAL EXAM:    General: Alert, cooperative, no distress, appears stated age  Head:  Normocephalic, atraumatic, mucous membranes moist  Eyes:  Conjunctiva/corneas clear, EOM's intact     Neck:  Supple,  no JVD or bruit     Lungs: Clear to auscultation bilaterally, no wheezes rhonchi rales are noted  Chest wall: No tenderness  Musculoskeletal:   Ambulates freely without assistance  Heart::  Regular rate and rhythm, S1 and S2 normal, no murmur, rub or gallop  Abdomen: Soft, non-tender, nondistended, bowel sounds active, no abdominal bruit  Extremities: No cyanosis, clubbing, or edema   Pulses: 2+ and symmetric all extremities  Skin:  No rashes or lesions  Neuro/psych: A&O x3. CN II through XII are grossly intact with appropriate affect        Past Medical History:   Diagnosis Date   • Chest pain 09/24/2018    Associated w/Nausea/SOB---Seen @ [facility] ER   • Coronary heart disease 09/2018   • Family history of heart disease     Mom/Dad/Uncles   • History of EKG 09/24/18-[facility] ER    T-Wave Inversion in Inferior Leads   • Hx of chest x-ray 09/24/18[facility] ER    WNL w/Possible Atelectasis or Pneumonia   • Hyperlipidemia    • Hypertension    • Ischemic heart disease    • NSTEMI (non-ST elevated myocardial infarction) ([facility]) 09/24/2018       Past Surgical History:   Procedure Laterality Date   • CARDIAC CATHETERIZATION Left 09/4/18[facility]    w/Selective Coronary Arteriography/Intra-Aortic Balloon Pump Placement-Dr. Henderson--See Report   • LEG SURGERY      Right leg surgery steel plate-screws         Current Outpatient Medications:   •  amLODIPine (NORVASC) 10 MG tablet, TAKE ONE TABLET BY MOUTH DAILY, Disp: 90 tablet, Rfl: 3  •  aspirin (aspirin) 81 MG EC tablet, Daily., Disp: , Rfl:   •  atorvastatin (LIPITOR) 20 MG tablet, TAKE ONE TABLET BY MOUTH DAILY, Disp: 90 tablet,  "Rfl: 1  •  metoprolol tartrate (LOPRESSOR) 25 MG tablet, TAKE ONE TABLET BY MOUTH EVERY 12 HOURS, Disp: 180 tablet, Rfl: 0  •  warfarin (COUMADIN) 5 MG tablet, TAKE 2 TABLETS BY MOUTH ON WEDNESDAY AND SATURDAY THEN TAKE ONE AND ONE-HALF TABLET BY MOUTH ON ALL OTHER DAYS OF THE WEEK, Disp: 150 tablet, Rfl: 1    No Known Allergies    Family History   Problem Relation Age of Onset   • Heart disease Mother    • Heart disease Father    • Heart disease Paternal Uncle        Social History     Tobacco Use   • Smoking status: Former Smoker     Types: Cigarettes   • Smokeless tobacco: Never Used   Substance Use Topics   • Alcohol use: No           Current Electrocardiogram:    ECG 12 Lead    Date/Time: 8/18/2022 2:47 PM  Performed by: Roro Corley APRN  Authorized by: Roro Corley APRN   Comparison: not compared with previous ECG   Rhythm: sinus rhythm  BPM: 69  Conduction: 1st degree AV block                EMR Dragon/Transcription:   \"Dictated utilizing Dragon dictation\".       "

## 2022-08-19 PROBLEM — E78.2 HYPERLIPIDEMIA, MIXED: Status: ACTIVE | Noted: 2018-10-02

## 2022-08-31 ENCOUNTER — TELEPHONE (OUTPATIENT)
Dept: CARDIOLOGY | Facility: CLINIC | Age: 57
End: 2022-08-31

## 2022-08-31 NOTE — TELEPHONE ENCOUNTER
Caller: Mike Prado    Relationship: Self    Best call back number: [phone]    What is the best time to reach you: WORKS 2ND SHIFT-MORNINGS BEST    Who are you requesting to speak with (clinical staff, provider,  specific staff member): BENY        What was the call regarding:PT HAS NOT HAD PT/INR CHECK BECAUSE HE WAS SUPPOSE TO HAVE SURGERY THIS MONTH, BUT IT WAS RESCHEDULE. HE HAS NOT HAD IT CHECKED IN A MONTH. HE NEEDS TO SCHEDULE    Do you require a callback: YES

## 2022-09-06 ENCOUNTER — ANTICOAGULATION VISIT (OUTPATIENT)
Dept: CARDIOLOGY | Facility: CLINIC | Age: 57
End: 2022-09-06

## 2022-09-06 DIAGNOSIS — I25.41 ANEURYSM OF CORONARY VESSELS: Primary | ICD-10-CM

## 2022-09-06 LAB — INR PPP: 2.2 (ref 0.9–1.1)

## 2022-09-06 PROCEDURE — 85610 PROTHROMBIN TIME: CPT | Performed by: INTERNAL MEDICINE

## 2022-09-06 PROCEDURE — 93793 ANTICOAG MGMT PT WARFARIN: CPT | Performed by: INTERNAL MEDICINE

## 2022-09-06 PROCEDURE — 36416 COLLJ CAPILLARY BLOOD SPEC: CPT | Performed by: INTERNAL MEDICINE

## 2022-09-12 RX ORDER — ATORVASTATIN CALCIUM 20 MG/1
TABLET, FILM COATED ORAL
Qty: 90 TABLET | Refills: 1 | Status: SHIPPED | OUTPATIENT
Start: 2022-09-12 | End: 2023-03-09

## 2022-10-10 DIAGNOSIS — I10 PRIMARY HYPERTENSION: ICD-10-CM

## 2022-10-10 RX ORDER — AMLODIPINE BESYLATE 10 MG/1
TABLET ORAL
Qty: 90 TABLET | Refills: 1 | Status: SHIPPED | OUTPATIENT
Start: 2022-10-10

## 2022-10-11 ENCOUNTER — ANTICOAGULATION VISIT (OUTPATIENT)
Dept: CARDIOLOGY | Facility: CLINIC | Age: 57
End: 2022-10-11

## 2022-10-11 DIAGNOSIS — I25.41 ANEURYSM OF CORONARY VESSELS: Primary | ICD-10-CM

## 2022-10-11 LAB — INR PPP: 2.2 (ref 0.9–1.1)

## 2022-10-11 PROCEDURE — 93793 ANTICOAG MGMT PT WARFARIN: CPT | Performed by: INTERNAL MEDICINE

## 2022-10-11 PROCEDURE — 85610 PROTHROMBIN TIME: CPT | Performed by: INTERNAL MEDICINE

## 2022-10-11 PROCEDURE — 36416 COLLJ CAPILLARY BLOOD SPEC: CPT | Performed by: INTERNAL MEDICINE

## 2022-11-08 ENCOUNTER — ANTICOAGULATION VISIT (OUTPATIENT)
Dept: CARDIOLOGY | Facility: CLINIC | Age: 57
End: 2022-11-08

## 2022-11-08 DIAGNOSIS — I25.41 ANEURYSM OF CORONARY VESSELS: Primary | ICD-10-CM

## 2022-11-08 LAB — INR PPP: 1.4 (ref 0.9–1.1)

## 2022-11-08 PROCEDURE — 36416 COLLJ CAPILLARY BLOOD SPEC: CPT | Performed by: INTERNAL MEDICINE

## 2022-11-08 PROCEDURE — 93793 ANTICOAG MGMT PT WARFARIN: CPT | Performed by: INTERNAL MEDICINE

## 2022-11-08 PROCEDURE — 85610 PROTHROMBIN TIME: CPT | Performed by: INTERNAL MEDICINE

## 2022-12-06 ENCOUNTER — ANTICOAGULATION VISIT (OUTPATIENT)
Dept: CARDIOLOGY | Facility: CLINIC | Age: 57
End: 2022-12-06

## 2022-12-06 DIAGNOSIS — I25.41 ANEURYSM OF CORONARY VESSELS: Primary | ICD-10-CM

## 2022-12-06 LAB — INR PPP: 1.4 (ref 0.9–1.1)

## 2022-12-06 PROCEDURE — 93793 ANTICOAG MGMT PT WARFARIN: CPT | Performed by: INTERNAL MEDICINE

## 2022-12-06 PROCEDURE — 36416 COLLJ CAPILLARY BLOOD SPEC: CPT | Performed by: INTERNAL MEDICINE

## 2022-12-06 PROCEDURE — 85610 PROTHROMBIN TIME: CPT | Performed by: INTERNAL MEDICINE

## 2022-12-13 ENCOUNTER — ANTICOAGULATION VISIT (OUTPATIENT)
Dept: CARDIOLOGY | Facility: CLINIC | Age: 57
End: 2022-12-13

## 2022-12-13 DIAGNOSIS — I25.41 ANEURYSM OF CORONARY VESSELS: Primary | ICD-10-CM

## 2022-12-13 LAB — INR PPP: 1.5 (ref 0.9–1.1)

## 2022-12-13 PROCEDURE — 93793 ANTICOAG MGMT PT WARFARIN: CPT | Performed by: INTERNAL MEDICINE

## 2022-12-13 PROCEDURE — 36416 COLLJ CAPILLARY BLOOD SPEC: CPT | Performed by: INTERNAL MEDICINE

## 2022-12-13 PROCEDURE — 85610 PROTHROMBIN TIME: CPT | Performed by: INTERNAL MEDICINE

## 2022-12-20 ENCOUNTER — ANTICOAGULATION VISIT (OUTPATIENT)
Dept: CARDIOLOGY | Facility: CLINIC | Age: 57
End: 2022-12-20

## 2022-12-20 DIAGNOSIS — I25.41 ANEURYSM OF CORONARY VESSELS: Primary | ICD-10-CM

## 2022-12-20 LAB — INR PPP: 1.7 (ref 0.9–1.1)

## 2022-12-20 PROCEDURE — 85610 PROTHROMBIN TIME: CPT | Performed by: INTERNAL MEDICINE

## 2022-12-20 PROCEDURE — 93793 ANTICOAG MGMT PT WARFARIN: CPT | Performed by: INTERNAL MEDICINE

## 2022-12-20 PROCEDURE — 36416 COLLJ CAPILLARY BLOOD SPEC: CPT | Performed by: INTERNAL MEDICINE

## 2022-12-27 ENCOUNTER — ANTICOAGULATION VISIT (OUTPATIENT)
Dept: CARDIOLOGY | Facility: CLINIC | Age: 57
End: 2022-12-27

## 2022-12-27 DIAGNOSIS — I25.41 ANEURYSM OF CORONARY VESSELS: Primary | ICD-10-CM

## 2022-12-27 LAB — INR PPP: 2.6 (ref 0.9–1.1)

## 2022-12-27 PROCEDURE — 93793 ANTICOAG MGMT PT WARFARIN: CPT | Performed by: INTERNAL MEDICINE

## 2022-12-27 PROCEDURE — 36416 COLLJ CAPILLARY BLOOD SPEC: CPT | Performed by: INTERNAL MEDICINE

## 2022-12-27 PROCEDURE — 85610 PROTHROMBIN TIME: CPT | Performed by: INTERNAL MEDICINE

## 2023-01-10 ENCOUNTER — ANTICOAGULATION VISIT (OUTPATIENT)
Dept: CARDIOLOGY | Facility: CLINIC | Age: 58
End: 2023-01-10
Payer: COMMERCIAL

## 2023-01-10 DIAGNOSIS — I25.41 ANEURYSM OF CORONARY VESSELS: Primary | ICD-10-CM

## 2023-01-10 LAB — INR PPP: 1.9 (ref 0.9–1.1)

## 2023-01-10 PROCEDURE — 36416 COLLJ CAPILLARY BLOOD SPEC: CPT | Performed by: INTERNAL MEDICINE

## 2023-01-10 PROCEDURE — 85610 PROTHROMBIN TIME: CPT | Performed by: INTERNAL MEDICINE

## 2023-01-10 PROCEDURE — 93793 ANTICOAG MGMT PT WARFARIN: CPT | Performed by: INTERNAL MEDICINE

## 2023-01-31 ENCOUNTER — ANTICOAGULATION VISIT (OUTPATIENT)
Dept: CARDIOLOGY | Facility: CLINIC | Age: 58
End: 2023-01-31
Payer: COMMERCIAL

## 2023-01-31 DIAGNOSIS — I25.41 ANEURYSM OF CORONARY VESSELS: Primary | ICD-10-CM

## 2023-01-31 LAB — INR PPP: 2 (ref 0.9–1.1)

## 2023-01-31 PROCEDURE — 93793 ANTICOAG MGMT PT WARFARIN: CPT | Performed by: INTERNAL MEDICINE

## 2023-01-31 PROCEDURE — 85610 PROTHROMBIN TIME: CPT | Performed by: INTERNAL MEDICINE

## 2023-01-31 PROCEDURE — 36416 COLLJ CAPILLARY BLOOD SPEC: CPT | Performed by: INTERNAL MEDICINE

## 2023-02-13 RX ORDER — WARFARIN SODIUM 5 MG/1
TABLET ORAL
Qty: 150 TABLET | Refills: 1 | Status: SHIPPED | OUTPATIENT
Start: 2023-02-13

## 2023-02-16 ENCOUNTER — TRANSCRIBE ORDERS (OUTPATIENT)
Dept: ADMINISTRATIVE | Facility: HOSPITAL | Age: 58
End: 2023-02-16
Payer: COMMERCIAL

## 2023-02-16 DIAGNOSIS — R10.2 PELVIC PAIN: Primary | ICD-10-CM

## 2023-02-16 DIAGNOSIS — R10.10 PAIN OF UPPER ABDOMEN: ICD-10-CM

## 2023-02-28 ENCOUNTER — ANTICOAGULATION VISIT (OUTPATIENT)
Dept: CARDIOLOGY | Facility: CLINIC | Age: 58
End: 2023-02-28
Payer: COMMERCIAL

## 2023-02-28 DIAGNOSIS — I25.41 ANEURYSM OF CORONARY VESSELS: Primary | ICD-10-CM

## 2023-02-28 LAB — INR PPP: 3.5 (ref 0.9–1.1)

## 2023-02-28 PROCEDURE — 93793 ANTICOAG MGMT PT WARFARIN: CPT | Performed by: INTERNAL MEDICINE

## 2023-02-28 PROCEDURE — 36416 COLLJ CAPILLARY BLOOD SPEC: CPT | Performed by: INTERNAL MEDICINE

## 2023-02-28 PROCEDURE — 85610 PROTHROMBIN TIME: CPT | Performed by: INTERNAL MEDICINE

## 2023-03-09 ENCOUNTER — HOSPITAL ENCOUNTER (OUTPATIENT)
Dept: CT IMAGING | Facility: HOSPITAL | Age: 58
Discharge: HOME | End: 2023-03-09
Admitting: NURSE PRACTITIONER
Payer: COMMERCIAL

## 2023-03-09 DIAGNOSIS — R10.2 PELVIC PAIN: ICD-10-CM

## 2023-03-09 DIAGNOSIS — R10.10 PAIN OF UPPER ABDOMEN: ICD-10-CM

## 2023-03-09 LAB
CREAT BLDA-MCNC: 0.8 MG/DL (ref 0.6–1.3)
EGFRCR SERPLBLD CKD-EPI 2021: 103.2 ML/MIN/1.73

## 2023-03-09 PROCEDURE — 82565 ASSAY OF CREATININE: CPT

## 2023-03-09 PROCEDURE — 74178 CT ABD&PLV WO CNTR FLWD CNTR: CPT

## 2023-03-09 PROCEDURE — 25510000001 IOPAMIDOL PER 1 ML: Performed by: NURSE PRACTITIONER

## 2023-03-09 RX ORDER — ATORVASTATIN CALCIUM 20 MG/1
TABLET, FILM COATED ORAL
Qty: 90 TABLET | Refills: 1 | Status: SHIPPED | OUTPATIENT
Start: 2023-03-09 | End: 2023-03-10

## 2023-03-09 RX ADMIN — IOPAMIDOL 100 ML: 755 INJECTION, SOLUTION INTRAVENOUS at 13:08

## 2023-03-10 RX ORDER — ATORVASTATIN CALCIUM 20 MG/1
TABLET, FILM COATED ORAL
Qty: 90 TABLET | Refills: 1 | Status: SHIPPED | OUTPATIENT
Start: 2023-03-10

## 2023-03-14 ENCOUNTER — OFFICE VISIT (OUTPATIENT)
Dept: CARDIOLOGY | Facility: CLINIC | Age: 58
End: 2023-03-14
Payer: COMMERCIAL

## 2023-03-14 VITALS
SYSTOLIC BLOOD PRESSURE: 122 MMHG | HEART RATE: 58 BPM | WEIGHT: 176 LBS | BODY MASS INDEX: 26.07 KG/M2 | DIASTOLIC BLOOD PRESSURE: 80 MMHG | HEIGHT: 69 IN

## 2023-03-14 DIAGNOSIS — I10 PRIMARY HYPERTENSION: ICD-10-CM

## 2023-03-14 DIAGNOSIS — I25.42 SPONTANEOUS DISSECTION OF CORONARY ARTERY: Primary | ICD-10-CM

## 2023-03-14 DIAGNOSIS — E78.2 HYPERLIPIDEMIA, MIXED: ICD-10-CM

## 2023-03-14 PROCEDURE — 99213 OFFICE O/P EST LOW 20 MIN: CPT | Performed by: NURSE PRACTITIONER

## 2023-03-14 PROCEDURE — 93000 ELECTROCARDIOGRAM COMPLETE: CPT | Performed by: NURSE PRACTITIONER

## 2023-03-14 NOTE — PROGRESS NOTES
"  [facility] CARDIOLOGY      REASON FOR FOLLOW-UP:  History of non-STEMI  History of RCA dissection          Chief Complaint   Patient presents with   • Heart Problem         Dear Patrick, Michael Shore MD        History of Present Illness   It was my pleasure to see Robin Prado in the office today.  He is a 57-year-old  male with history of non ST-elevation myocardial infarction secondary to dissection of the RCA September 2018.  Follow-up heart catheterization showed resolution of his aneurysmal RCA.  He was discharged home on warfarin therapy.  He has additional risk factors that include hypertension, dyslipidemia and prior tobacco use.  He presents today in follow-up for these diagnoses.    The patient reports he unfortunately slipped on the ice during the winter and injured his shoulder which hindered his exercising.  He has regained 20 pounds of the 50 he lost last summer.  He denies any complaints of chest pain, pressure, tightness or palpitations.  He denies any shortness of breath at rest, dyspnea with exertion, orthopnea or PND.  He denies any lower extremity edema, dizziness or lightheadedness.  No near syncopal or syncopal episodes.  He denies any abnormal bleeding such as melena, hematochezia, hematuria or epistaxis.  Overall, the patient reports that he is doing quite well from a cardiovascular standpoint.    He reports recent findings of \"a lump\" in his left groin area which she initially thought was possibly hernia.  He then felt a knot in his right lower abdomen and was sent for CT abdomen/pelvis.  He is awaiting results      ASSESSMENT:  History of spontaneous dissection of the RCA  Non-ST elevation MI  Former EtOH abuse  Coagulopathy on warfarin  Dizziness  Hypertension  Dyslipidemia  Prior tobacco use    PLAN:  Continue current CV plan of care to include low-dose aspirin, amlodipine 10 mg, Lopressor 25 mg.  Continue statin for HLD, continue warfarin with goal INR " 2.0-3.0.  I will check surveillance labs to include BMP, CBC, lipids and notify patient for any outliers.  Follow-up in 6 months or sooner if needed      Diagnoses and all orders for this visit:    1. Spontaneous dissection of coronary artery (Primary)  -     Lipid Panel; Future  -     CBC (No Diff); Future  -     Basic Metabolic Panel; Future    2. Primary hypertension  -     Lipid Panel; Future  -     CBC (No Diff); Future  -     Basic Metabolic Panel; Future    3. Hyperlipidemia, mixed  -     Lipid Panel; Future  -     CBC (No Diff); Future  -     Basic Metabolic Panel; Future    Other orders  -     ECG 12 Lead          The following portions of the patient's history were reviewed and updated as appropriate: allergies, current medications, past family history, past medical history, past social history, past surgical history and problem list.    REVIEW OF SYSTEMS:    Review of Systems   All other systems reviewed and are negative.      Vitals:    03/14/23 1311   BP: 122/80   Pulse: 58         PHYSICAL EXAM:    General: Alert, cooperative, no distress, appears stated age  Head:  Normocephalic, atraumatic, mucous membranes moist  Eyes:  Conjunctiva/corneas clear, EOM's intact     Neck:  Supple,  no JVD or bruit     Lungs: Clear to auscultation bilaterally, no wheezes rhonchi rales are noted  Chest wall: No tenderness  Musculoskeletal:   Ambulates freely without assistance  Heart::  Regular rate and rhythm, S1 and S2 normal, no murmur, rub or gallop  Abdomen: Soft, non-tender, nondistended, bowel sounds active, no abdominal bruit  Extremities: No cyanosis, clubbing, or edema   Pulses: 2+ and symmetric all extremities  Skin:  No rashes or lesions  Neuro/psych: A&O x3. CN II through XII are grossly intact with appropriate affect        Past Medical History:   Diagnosis Date   • Chest pain 09/24/2018    Associated w/Nausea/SOB---Seen @ [facility] ER   • Coronary heart disease 09/2018   • Family history of heart disease      "Mom/Dad/Uncles   • History of EKG 09/24/18-[facility] ER    T-Wave Inversion in Inferior Leads   • Hx of chest x-ray 09/24/18-[facility] ER    WNL w/Possible Atelectasis or Pneumonia   • Hyperlipidemia    • Hypertension    • Ischemic heart disease    • NSTEMI (non-ST elevated myocardial infarction) ([facility]) 09/24/2018       Past Surgical History:   Procedure Laterality Date   • CARDIAC CATHETERIZATION Left 09/4/18-[facility]    w/Selective Coronary Arteriography/Intra-Aortic Balloon Pump Placement-Dr. Henderson--See Report   • LEG SURGERY      Right leg surgery steel plate-screws         Current Outpatient Medications:   •  amLODIPine (NORVASC) 10 MG tablet, TAKE ONE TABLET BY MOUTH DAILY, Disp: 90 tablet, Rfl: 1  •  aspirin (aspirin) 81 MG EC tablet, Daily., Disp: , Rfl:   •  atorvastatin (LIPITOR) 20 MG tablet, TAKE ONE TABLET BY MOUTH DAILY, Disp: 90 tablet, Rfl: 1  •  metoprolol tartrate (LOPRESSOR) 25 MG tablet, TAKE ONE TABLET BY MOUTH EVERY 12 HOURS, Disp: 180 tablet, Rfl: 0  •  warfarin (COUMADIN) 5 MG tablet, TAKE 2 TABLETS BY MOUTH daily or as directed, Disp: 150 tablet, Rfl: 1    No Known Allergies    Family History   Problem Relation Age of Onset   • Heart disease Mother    • Heart disease Father    • Heart disease Paternal Uncle        Social History     Tobacco Use   • Smoking status: Former     Types: Cigarettes   • Smokeless tobacco: Never   Substance Use Topics   • Alcohol use: No           Current Electrocardiogram:    ECG 12 Lead    Date/Time: 3/14/2023 2:22 PM  Performed by: Roro Corley APRN  Authorized by: Roro Corley APRN   Comparison: not compared with previous ECG   Previous ECG: no previous ECG available  Rhythm: sinus rhythm and sinus bradycardia  BPM: 58  Conduction: 1st degree AV block and non-specific intraventricular conduction delay                EMR Dragon/Transcription:   \"Dictated utilizing Dragon dictation\".       "

## 2023-03-21 ENCOUNTER — ANTICOAGULATION VISIT (OUTPATIENT)
Dept: CARDIOLOGY | Facility: CLINIC | Age: 58
End: 2023-03-21
Payer: COMMERCIAL

## 2023-03-21 ENCOUNTER — LAB (OUTPATIENT)
Dept: LAB | Facility: HOSPITAL | Age: 58
End: 2023-03-21
Payer: COMMERCIAL

## 2023-03-21 DIAGNOSIS — I25.41 ANEURYSM OF CORONARY VESSELS: Primary | ICD-10-CM

## 2023-03-21 DIAGNOSIS — I25.42 SPONTANEOUS DISSECTION OF CORONARY ARTERY: ICD-10-CM

## 2023-03-21 DIAGNOSIS — I10 PRIMARY HYPERTENSION: ICD-10-CM

## 2023-03-21 DIAGNOSIS — E78.2 HYPERLIPIDEMIA, MIXED: ICD-10-CM

## 2023-03-21 LAB
ANION GAP SERPL CALCULATED.3IONS-SCNC: 9.8 MMOL/L (ref 5–15)
BUN SERPL-MCNC: 23 MG/DL (ref 6–20)
BUN/CREAT SERPL: 28.8 (ref 7–25)
CALCIUM SPEC-SCNC: 9.6 MG/DL (ref 8.6–10.5)
CHLORIDE SERPL-SCNC: 103 MMOL/L (ref 98–107)
CHOLEST SERPL-MCNC: 147 MG/DL (ref 0–200)
CO2 SERPL-SCNC: 25.2 MMOL/L (ref 22–29)
CREAT SERPL-MCNC: 0.8 MG/DL (ref 0.76–1.27)
DEPRECATED RDW RBC AUTO: 40.1 FL (ref 37–54)
EGFRCR SERPLBLD CKD-EPI 2021: 103.2 ML/MIN/1.73
ERYTHROCYTE [DISTWIDTH] IN BLOOD BY AUTOMATED COUNT: 11.8 % (ref 12.3–15.4)
GLUCOSE SERPL-MCNC: 112 MG/DL (ref 65–99)
HCT VFR BLD AUTO: 40.6 % (ref 37.5–51)
HDLC SERPL-MCNC: 65 MG/DL (ref 40–60)
HGB BLD-MCNC: 14.2 G/DL (ref 13–17.7)
INR PPP: 3 (ref 0.9–1.1)
LDLC SERPL CALC-MCNC: 71 MG/DL (ref 0–100)
LDLC/HDLC SERPL: 1.11 {RATIO}
MCH RBC QN AUTO: 33.1 PG (ref 26.6–33)
MCHC RBC AUTO-ENTMCNC: 35 G/DL (ref 31.5–35.7)
MCV RBC AUTO: 94.6 FL (ref 79–97)
PLATELET # BLD AUTO: 316 10*3/MM3 (ref 140–450)
PMV BLD AUTO: 9.3 FL (ref 6–12)
POTASSIUM SERPL-SCNC: 5.1 MMOL/L (ref 3.5–5.2)
RBC # BLD AUTO: 4.29 10*6/MM3 (ref 4.14–5.8)
SODIUM SERPL-SCNC: 138 MMOL/L (ref 136–145)
TRIGL SERPL-MCNC: 50 MG/DL (ref 0–150)
VLDLC SERPL-MCNC: 11 MG/DL (ref 5–40)
WBC NRBC COR # BLD: 6.39 10*3/MM3 (ref 3.4–10.8)

## 2023-03-21 PROCEDURE — 85610 PROTHROMBIN TIME: CPT | Performed by: INTERNAL MEDICINE

## 2023-03-21 PROCEDURE — 80048 BASIC METABOLIC PNL TOTAL CA: CPT

## 2023-03-21 PROCEDURE — 80061 LIPID PANEL: CPT

## 2023-03-21 PROCEDURE — 36415 COLL VENOUS BLD VENIPUNCTURE: CPT

## 2023-03-21 PROCEDURE — 85027 COMPLETE CBC AUTOMATED: CPT

## 2023-03-21 PROCEDURE — 36416 COLLJ CAPILLARY BLOOD SPEC: CPT | Performed by: INTERNAL MEDICINE

## 2023-03-21 PROCEDURE — 93793 ANTICOAG MGMT PT WARFARIN: CPT | Performed by: INTERNAL MEDICINE

## 2023-04-11 DIAGNOSIS — I10 PRIMARY HYPERTENSION: ICD-10-CM

## 2023-04-11 RX ORDER — AMLODIPINE BESYLATE 10 MG/1
10 TABLET ORAL DAILY
Qty: 90 TABLET | Refills: 1 | Status: SHIPPED | OUTPATIENT
Start: 2023-04-11

## 2023-04-18 ENCOUNTER — ANTICOAGULATION VISIT (OUTPATIENT)
Dept: CARDIOLOGY | Facility: CLINIC | Age: 58
End: 2023-04-18
Payer: COMMERCIAL

## 2023-04-18 ENCOUNTER — LAB (OUTPATIENT)
Dept: LAB | Facility: HOSPITAL | Age: 58
End: 2023-04-18
Payer: COMMERCIAL

## 2023-04-18 ENCOUNTER — TRANSCRIBE ORDERS (OUTPATIENT)
Dept: ADMINISTRATIVE | Facility: HOSPITAL | Age: 58
End: 2023-04-18
Payer: COMMERCIAL

## 2023-04-18 DIAGNOSIS — Z12.5 SPECIAL SCREENING FOR MALIGNANT NEOPLASM OF PROSTATE: ICD-10-CM

## 2023-04-18 DIAGNOSIS — Z12.5 SPECIAL SCREENING FOR MALIGNANT NEOPLASM OF PROSTATE: Primary | ICD-10-CM

## 2023-04-18 DIAGNOSIS — I25.41 ANEURYSM OF CORONARY VESSELS: Primary | ICD-10-CM

## 2023-04-18 LAB
INR PPP: 3.1 (ref 0.9–1.1)
PSA SERPL-MCNC: 0.83 NG/ML (ref 0–4)

## 2023-04-18 PROCEDURE — 85610 PROTHROMBIN TIME: CPT | Performed by: INTERNAL MEDICINE

## 2023-04-18 PROCEDURE — 93793 ANTICOAG MGMT PT WARFARIN: CPT | Performed by: INTERNAL MEDICINE

## 2023-04-18 PROCEDURE — 36416 COLLJ CAPILLARY BLOOD SPEC: CPT | Performed by: INTERNAL MEDICINE

## 2023-04-18 PROCEDURE — G0103 PSA SCREENING: HCPCS

## 2023-04-18 PROCEDURE — 36415 COLL VENOUS BLD VENIPUNCTURE: CPT

## 2023-05-09 ENCOUNTER — ANTICOAGULATION VISIT (OUTPATIENT)
Dept: CARDIOLOGY | Facility: CLINIC | Age: 58
End: 2023-05-09
Payer: COMMERCIAL

## 2023-05-09 DIAGNOSIS — I25.41 ANEURYSM OF CORONARY VESSELS: Primary | ICD-10-CM

## 2023-05-09 LAB — INR PPP: 1.3 (ref 0.9–1.1)

## 2023-05-09 PROCEDURE — 36416 COLLJ CAPILLARY BLOOD SPEC: CPT | Performed by: INTERNAL MEDICINE

## 2023-05-09 PROCEDURE — 85610 PROTHROMBIN TIME: CPT | Performed by: INTERNAL MEDICINE

## 2023-05-09 PROCEDURE — 93793 ANTICOAG MGMT PT WARFARIN: CPT | Performed by: INTERNAL MEDICINE

## 2023-05-23 ENCOUNTER — ANTICOAGULATION VISIT (OUTPATIENT)
Dept: CARDIOLOGY | Facility: CLINIC | Age: 58
End: 2023-05-23
Payer: COMMERCIAL

## 2023-05-23 DIAGNOSIS — I25.41 ANEURYSM OF CORONARY VESSELS: Primary | ICD-10-CM

## 2023-05-23 LAB — INR PPP: 3 (ref 0.9–1.1)

## 2023-05-23 PROCEDURE — 85610 PROTHROMBIN TIME: CPT | Performed by: INTERNAL MEDICINE

## 2023-05-23 PROCEDURE — 36416 COLLJ CAPILLARY BLOOD SPEC: CPT | Performed by: INTERNAL MEDICINE

## 2023-05-23 PROCEDURE — 93793 ANTICOAG MGMT PT WARFARIN: CPT | Performed by: INTERNAL MEDICINE

## 2023-06-02 NOTE — PATIENT INSTRUCTIONS
Continue current Coumadin regimen.   Patient: Leydi Lazcano    Procedure Summary     Date: 06/02/23 Room / Location: [facility] OR 02 / [facility] MAIN OR    Anesthesia Start: 0828 Anesthesia Stop: 0902    Procedure: DILATATION AND CURETTAGE HYSTEROSCOPY NOVASURE ENDOMETRIAL ABLATION (Bilateral: Vagina) Diagnosis:       Abnormal uterine bleeding (AUB)      (Abnormal uterine bleeding (AUB) [N93.9])    Surgeons: Erika Best DO Provider: Tera Hughes MD    Anesthesia Type: general ASA Status: 2          Anesthesia Type: general    Vitals  Vitals Value Taken Time   /75 06/02/23 0935   Temp 36.2 °C (97.2 °F) 06/02/23 0935   Pulse 74 06/02/23 0935   Resp 12 06/02/23 0935   SpO2 96 % 06/02/23 0935           Post Anesthesia Care and Evaluation    Patient location during evaluation: bedside  Patient participation: complete - patient participated  Level of consciousness: awake  Pain management: adequate    Airway patency: patent  PONV Status: none  Cardiovascular status: acceptable and stable  Respiratory status: acceptable  Hydration status: acceptable    Comments: An Anesthesiologist personally participated in the most demanding procedures (including induction and emergence if applicable) in the anesthesia plan, monitored the course of anesthesia administration at frequent intervals and remained physically present and available for immediate diagnosis and treatment of emergencies.

## 2023-06-13 ENCOUNTER — ANTICOAGULATION VISIT (OUTPATIENT)
Dept: CARDIOLOGY | Facility: CLINIC | Age: 58
End: 2023-06-13
Payer: COMMERCIAL

## 2023-06-13 DIAGNOSIS — I25.41 ANEURYSM OF CORONARY VESSELS: Primary | ICD-10-CM

## 2023-06-13 LAB — INR PPP: 1.7 (ref 0.9–1.1)

## 2023-06-13 PROCEDURE — 93793 ANTICOAG MGMT PT WARFARIN: CPT | Performed by: INTERNAL MEDICINE

## 2023-06-13 PROCEDURE — 85610 PROTHROMBIN TIME: CPT | Performed by: INTERNAL MEDICINE

## 2023-06-13 PROCEDURE — 36416 COLLJ CAPILLARY BLOOD SPEC: CPT | Performed by: INTERNAL MEDICINE

## 2023-07-24 RX ORDER — WARFARIN SODIUM 5 MG/1
TABLET ORAL
Qty: 180 TABLET | Refills: 0 | Status: SHIPPED | OUTPATIENT
Start: 2023-07-24

## 2023-08-08 ENCOUNTER — ANTICOAGULATION VISIT (OUTPATIENT)
Dept: CARDIOLOGY | Facility: CLINIC | Age: 58
End: 2023-08-08
Payer: COMMERCIAL

## 2023-08-08 DIAGNOSIS — I25.41 ANEURYSM OF CORONARY VESSELS: Primary | ICD-10-CM

## 2023-08-08 LAB — INR PPP: 2.7 (ref 0.9–1.1)

## 2023-08-08 PROCEDURE — 85610 PROTHROMBIN TIME: CPT | Performed by: INTERNAL MEDICINE

## 2023-08-08 PROCEDURE — 36416 COLLJ CAPILLARY BLOOD SPEC: CPT | Performed by: INTERNAL MEDICINE

## 2023-08-08 PROCEDURE — 93793 ANTICOAG MGMT PT WARFARIN: CPT | Performed by: INTERNAL MEDICINE

## 2023-09-05 ENCOUNTER — ANTICOAGULATION VISIT (OUTPATIENT)
Dept: CARDIOLOGY | Facility: CLINIC | Age: 58
End: 2023-09-05
Payer: COMMERCIAL

## 2023-09-05 DIAGNOSIS — I25.41 ANEURYSM OF CORONARY VESSELS: Primary | ICD-10-CM

## 2023-09-05 LAB — INR PPP: 3.1 (ref 0.9–1.1)

## 2023-09-05 PROCEDURE — 93793 ANTICOAG MGMT PT WARFARIN: CPT | Performed by: INTERNAL MEDICINE

## 2023-09-05 PROCEDURE — 85610 PROTHROMBIN TIME: CPT | Performed by: INTERNAL MEDICINE

## 2023-09-05 PROCEDURE — 36416 COLLJ CAPILLARY BLOOD SPEC: CPT | Performed by: INTERNAL MEDICINE

## 2023-09-12 ENCOUNTER — OFFICE VISIT (OUTPATIENT)
Dept: CARDIOLOGY | Facility: CLINIC | Age: 58
End: 2023-09-12
Payer: COMMERCIAL

## 2023-09-12 VITALS
WEIGHT: 165 LBS | BODY MASS INDEX: 24.44 KG/M2 | SYSTOLIC BLOOD PRESSURE: 123 MMHG | DIASTOLIC BLOOD PRESSURE: 82 MMHG | HEART RATE: 51 BPM | OXYGEN SATURATION: 100 % | HEIGHT: 69 IN

## 2023-09-12 DIAGNOSIS — E78.2 HYPERLIPIDEMIA, MIXED: ICD-10-CM

## 2023-09-12 DIAGNOSIS — Z79.01 LONG TERM CURRENT USE OF ANTICOAGULANT THERAPY: ICD-10-CM

## 2023-09-12 DIAGNOSIS — I25.42 SPONTANEOUS DISSECTION OF CORONARY ARTERY: Primary | ICD-10-CM

## 2023-09-12 DIAGNOSIS — I10 PRIMARY HYPERTENSION: ICD-10-CM

## 2023-09-12 NOTE — PROGRESS NOTES
[facility] CARDIOLOGY      REASON FOR FOLLOW-UP:  History of non-ST elevation MI secondary to RCA dissection          Chief Complaint   Patient presents with    Heart Problem     6 mth f/u          Dear Patrick, Michael Shore MD        Heart Problem     It was my pleasure to see Robin Prado in the office today.  He is a 58-year-old  male with history of non ST-elevation myocardial infarction secondary to dissection of the RCA September 2018.  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  He was discharged home on warfarin therapy.  He has additional risk factors that include hypertension, dyslipidemia and prior tobacco use.  He presents today in follow-up for these diagnoses.    At last office visit the patient reported that shoulder injury that had hindered his exercising.  He reports today that his shoulder has healed well with good range of motion.  He reports no complaints of chest pain, pressure, tightness or palpitations.  He denies any shortness of breath at rest, dyspnea with exertion, orthopnea or PND.  He does have some seasonal allergies that cause him some respiratory issues from time to time.  He has rare dizziness that is mild, but denies lightheadedness, near syncopal or syncopal episodes.    Labs reviewed from 3/21/2023-lipids are within normal limits, BMP/CBC unremarkable.  Recent CT abdomen pelvis to evaluate a lump in his groin was negative for any concerns of cancer.      ASSESSMENT:  History of spontaneous dissection of the RCA  Non-ST elevation MI  Former EtOH abuse  Coagulopathy on warfarin  Dizziness  Hypertension  Dyslipidemia  Prior tobacco use    PLAN:  Continue current CV plan of care to include low-dose aspirin, amlodipine, Lopressor, statin.  Continue warfarin with goal INR 2.0-3.0  Surveillance labs in 1 year  Follow-up our office in 6 months or sooner if needed      Diagnoses and all orders for this visit:    1.  Spontaneous dissection of coronary artery (Primary)    2. Hyperlipidemia, mixed    3. Primary hypertension    4. Long term current use of anticoagulant therapy          The following portions of the patient's history were reviewed and updated as appropriate: allergies, current medications, past family history, past medical history, past social history, past surgical history, and problem list.    REVIEW OF SYSTEMS:    Review of Systems   Neurological:  Positive for dizziness.   All other systems reviewed and are negative.    Vitals:    09/12/23 1257   BP: 123/82   Pulse: 51   SpO2: 100%         PHYSICAL EXAM:    General: Alert, cooperative, no distress, appears stated age  Head:  Normocephalic, atraumatic, mucous membranes moist  Eyes:  Conjunctiva/corneas clear, EOM's intact     Neck:  Supple,  no JVD or bruit     Lungs: Clear to auscultation bilaterally, no wheezes rhonchi rales are noted  Chest wall: No tenderness  Musculoskeletal:   Ambulates freely without assistance  Heart::  Regular rate and rhythm, S1 and S2 normal, no murmur, rub or gallop  Abdomen: Soft, non-tender, nondistended, bowel sounds active, no abdominal bruit  Extremities: No cyanosis, clubbing, or edema   Pulses: 2+ and symmetric all extremities  Skin:  No rashes or lesions  Neuro/psych: A&O x3. CN II through XII are grossly intact with appropriate affect        Past Medical History:   Diagnosis Date    Chest pain 09/24/2018    Associated w/Nausea/SOB---Seen @ [facility] ER    Coronary heart disease 09/2018    Family history of heart disease     Mom/Dad/Uncles    History of EKG 09/24/18-[facility] ER    T-Wave Inversion in Inferior Leads    Hx of chest x-ray 09/24/18[facility] ER    WNL w/Possible Atelectasis or Pneumonia    Hyperlipidemia     Hypertension     Ischemic heart disease     NSTEMI (non-ST elevated myocardial infarction) 09/24/2018       Past Surgical History:   Procedure Laterality Date    CARDIAC CATHETERIZATION Left 09/4/18-[facility]    w/Selective Coronary  "Arteriography/Intra-Aortic Balloon Pump Placement-Dr. Henderson--See Report    LEG SURGERY      Right leg surgery steel plate-screws         Current Outpatient Medications:     amLODIPine (NORVASC) 10 MG tablet, Take 1 tablet by mouth Daily., Disp: 90 tablet, Rfl: 1    aspirin (aspirin) 81 MG EC tablet, Daily., Disp: , Rfl:     atorvastatin (LIPITOR) 20 MG tablet, TAKE ONE TABLET BY MOUTH DAILY, Disp: 90 tablet, Rfl: 1    metoprolol tartrate (LOPRESSOR) 25 MG tablet, TAKE ONE TABLET BY MOUTH EVERY 12 HOURS (Patient taking differently: 1 tablet Daily.), Disp: 180 tablet, Rfl: 0    warfarin (COUMADIN) 5 MG tablet, TAKE TWO TABLETS BY MOUTH DAILY OR AS DIRECTED, Disp: 180 tablet, Rfl: 0    No Known Allergies    Family History   Problem Relation Age of Onset    Heart disease Mother     Heart disease Father     Heart disease Paternal Uncle        Social History     Tobacco Use    Smoking status: Former     Types: Cigarettes    Smokeless tobacco: Never   Substance Use Topics    Alcohol use: No           Current Electrocardiogram:    ECG 12 Lead    Date/Time: 9/12/2023 3:16 PM  Performed by: Roro Corley APRN  Authorized by: Roro Corley APRN   Comparison: compared with previous ECG from 3/14/2023  Similar to previous ECG  Rhythm: sinus bradycardia  BPM: 51  Conduction: 1st degree AV block and non-specific intraventricular conduction delay            EMR Dragon/Transcription:   \"Dictated utilizing Dragon dictation\".       "

## 2023-10-03 ENCOUNTER — ANTICOAGULATION VISIT (OUTPATIENT)
Dept: CARDIOLOGY | Facility: CLINIC | Age: 58
End: 2023-10-03
Payer: COMMERCIAL

## 2023-10-03 DIAGNOSIS — I25.41 ANEURYSM OF CORONARY VESSELS: Primary | ICD-10-CM

## 2023-10-03 LAB — INR PPP: 3.2 (ref 0.9–1.1)

## 2023-10-03 PROCEDURE — 36416 COLLJ CAPILLARY BLOOD SPEC: CPT | Performed by: INTERNAL MEDICINE

## 2023-10-03 PROCEDURE — 85610 PROTHROMBIN TIME: CPT | Performed by: INTERNAL MEDICINE

## 2023-10-03 PROCEDURE — 93793 ANTICOAG MGMT PT WARFARIN: CPT | Performed by: INTERNAL MEDICINE

## 2023-10-10 DIAGNOSIS — I10 PRIMARY HYPERTENSION: ICD-10-CM

## 2023-10-10 RX ORDER — AMLODIPINE BESYLATE 10 MG/1
10 TABLET ORAL DAILY
Qty: 90 TABLET | Refills: 1 | Status: SHIPPED | OUTPATIENT
Start: 2023-10-10

## 2023-10-23 RX ORDER — WARFARIN SODIUM 5 MG/1
TABLET ORAL
Qty: 180 TABLET | Refills: 0 | Status: SHIPPED | OUTPATIENT
Start: 2023-10-23

## 2023-10-31 ENCOUNTER — ANTICOAGULATION VISIT (OUTPATIENT)
Dept: CARDIOLOGY | Facility: CLINIC | Age: 58
End: 2023-10-31
Payer: COMMERCIAL

## 2023-10-31 DIAGNOSIS — I25.41 ANEURYSM OF CORONARY VESSELS: Primary | ICD-10-CM

## 2023-10-31 LAB — INR PPP: 3 (ref 0.9–1.1)

## 2023-10-31 PROCEDURE — 36416 COLLJ CAPILLARY BLOOD SPEC: CPT | Performed by: INTERNAL MEDICINE

## 2023-10-31 PROCEDURE — 93793 ANTICOAG MGMT PT WARFARIN: CPT | Performed by: INTERNAL MEDICINE

## 2023-10-31 PROCEDURE — 85610 PROTHROMBIN TIME: CPT | Performed by: INTERNAL MEDICINE

## 2023-11-28 ENCOUNTER — ANTICOAGULATION VISIT (OUTPATIENT)
Dept: CARDIOLOGY | Facility: CLINIC | Age: 58
End: 2023-11-28
Payer: COMMERCIAL

## 2023-11-28 DIAGNOSIS — I25.41 ANEURYSM OF CORONARY VESSELS: Primary | ICD-10-CM

## 2023-11-28 LAB — INR PPP: 3.2 (ref 0.9–1.1)

## 2023-11-28 PROCEDURE — 36416 COLLJ CAPILLARY BLOOD SPEC: CPT | Performed by: INTERNAL MEDICINE

## 2023-11-28 PROCEDURE — 93793 ANTICOAG MGMT PT WARFARIN: CPT | Performed by: INTERNAL MEDICINE

## 2023-11-28 PROCEDURE — 85610 PROTHROMBIN TIME: CPT | Performed by: INTERNAL MEDICINE

## 2023-12-26 ENCOUNTER — ANTICOAGULATION VISIT (OUTPATIENT)
Dept: CARDIOLOGY | Facility: CLINIC | Age: 58
End: 2023-12-26
Payer: COMMERCIAL

## 2023-12-26 DIAGNOSIS — I25.41 ANEURYSM OF CORONARY VESSELS: Primary | ICD-10-CM

## 2023-12-26 LAB — INR PPP: 2.7 (ref 0.9–1.1)

## 2023-12-26 PROCEDURE — 85610 PROTHROMBIN TIME: CPT | Performed by: INTERNAL MEDICINE

## 2023-12-26 PROCEDURE — 36416 COLLJ CAPILLARY BLOOD SPEC: CPT | Performed by: INTERNAL MEDICINE

## 2023-12-26 PROCEDURE — 93793 ANTICOAG MGMT PT WARFARIN: CPT | Performed by: INTERNAL MEDICINE

## 2024-01-23 ENCOUNTER — ANTICOAGULATION VISIT (OUTPATIENT)
Dept: CARDIOLOGY | Facility: CLINIC | Age: 59
End: 2024-01-23
Payer: COMMERCIAL

## 2024-01-23 DIAGNOSIS — I25.41 ANEURYSM OF CORONARY VESSELS: Primary | ICD-10-CM

## 2024-01-23 LAB — INR PPP: 2.6 (ref 0.9–1.1)

## 2024-01-23 PROCEDURE — 36416 COLLJ CAPILLARY BLOOD SPEC: CPT | Performed by: INTERNAL MEDICINE

## 2024-01-23 PROCEDURE — 93793 ANTICOAG MGMT PT WARFARIN: CPT | Performed by: INTERNAL MEDICINE

## 2024-01-23 PROCEDURE — 85610 PROTHROMBIN TIME: CPT | Performed by: INTERNAL MEDICINE

## 2024-02-06 RX ORDER — WARFARIN SODIUM 5 MG/1
TABLET ORAL
Qty: 180 TABLET | Refills: 0 | Status: SHIPPED | OUTPATIENT
Start: 2024-02-06

## 2024-02-20 ENCOUNTER — ANTICOAGULATION VISIT (OUTPATIENT)
Dept: CARDIOLOGY | Facility: CLINIC | Age: 59
End: 2024-02-20
Payer: COMMERCIAL

## 2024-02-20 DIAGNOSIS — I25.41 ANEURYSM OF CORONARY VESSELS: Primary | ICD-10-CM

## 2024-02-20 LAB — INR PPP: 3.4 (ref 0.9–1.1)

## 2024-02-20 PROCEDURE — 36416 COLLJ CAPILLARY BLOOD SPEC: CPT | Performed by: INTERNAL MEDICINE

## 2024-02-20 PROCEDURE — 93793 ANTICOAG MGMT PT WARFARIN: CPT | Performed by: INTERNAL MEDICINE

## 2024-02-20 PROCEDURE — 85610 PROTHROMBIN TIME: CPT | Performed by: INTERNAL MEDICINE

## 2024-03-07 RX ORDER — ATORVASTATIN CALCIUM 20 MG/1
TABLET, FILM COATED ORAL
Qty: 90 TABLET | Refills: 1 | Status: SHIPPED | OUTPATIENT
Start: 2024-03-07

## 2024-03-12 ENCOUNTER — ANTICOAGULATION VISIT (OUTPATIENT)
Dept: CARDIOLOGY | Facility: CLINIC | Age: 59
End: 2024-03-12
Payer: COMMERCIAL

## 2024-03-12 ENCOUNTER — OFFICE VISIT (OUTPATIENT)
Dept: CARDIOLOGY | Facility: CLINIC | Age: 59
End: 2024-03-12
Payer: COMMERCIAL

## 2024-03-12 VITALS
BODY MASS INDEX: 29.77 KG/M2 | OXYGEN SATURATION: 96 % | SYSTOLIC BLOOD PRESSURE: 129 MMHG | HEART RATE: 56 BPM | DIASTOLIC BLOOD PRESSURE: 87 MMHG | HEIGHT: 69 IN | WEIGHT: 201 LBS

## 2024-03-12 DIAGNOSIS — I25.41 ANEURYSM OF CORONARY VESSELS: Primary | ICD-10-CM

## 2024-03-12 DIAGNOSIS — I10 PRIMARY HYPERTENSION: ICD-10-CM

## 2024-03-12 DIAGNOSIS — I25.10 CORONARY ARTERY DISEASE INVOLVING NATIVE CORONARY ARTERY OF NATIVE HEART WITHOUT ANGINA PECTORIS: Primary | ICD-10-CM

## 2024-03-12 DIAGNOSIS — E78.2 HYPERLIPIDEMIA, MIXED: ICD-10-CM

## 2024-03-12 LAB — INR PPP: 4 (ref 0.9–1.1)

## 2024-03-12 PROCEDURE — 93793 ANTICOAG MGMT PT WARFARIN: CPT | Performed by: INTERNAL MEDICINE

## 2024-03-12 PROCEDURE — 85610 PROTHROMBIN TIME: CPT | Performed by: INTERNAL MEDICINE

## 2024-03-12 PROCEDURE — 99214 OFFICE O/P EST MOD 30 MIN: CPT | Performed by: NURSE PRACTITIONER

## 2024-03-12 PROCEDURE — 93000 ELECTROCARDIOGRAM COMPLETE: CPT | Performed by: NURSE PRACTITIONER

## 2024-03-12 PROCEDURE — 36416 COLLJ CAPILLARY BLOOD SPEC: CPT | Performed by: INTERNAL MEDICINE

## 2024-03-12 NOTE — PROGRESS NOTES
[facility] CARDIOLOGY      REASON FOR FOLLOW-UP:  History of non-ST elevation MI secondary to RCA dissection           Chief Complaint   Patient presents with    Heart Problem         Dear Patrick, Michael Shore MD        History of Present Illness   It was my pleasure to see Robin Prado in the office today.  He is a 58-year-old  male with history of non ST-elevation myocardial infarction secondary to dissection of the RCA September 2018.  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  He was discharged home on warfarin therapy.  He has additional risk factors that include hypertension, dyslipidemia and prior tobacco use.  He presents today in follow-up for these diagnoses.    Today, Robin reports that he feels well from a cardiovascular standpoint.  Specifically, he denies any chest pain, pressure, tightness or palpitations.  He denies any shortness of breath at rest, dyspnea with exertion, orthopnea or PND.  No dizziness or lightheadedness.  He denies any abnormal bleeding.  INRs checked through our Coumadin clinic.  INR today was 4.0 and medication adjusted accordingly.      ASSESSMENT:  History of spontaneous dissection of the RCA  Non-ST elevation MI  Former EtOH abuse  Coagulopathy on warfarin  Dizziness  Hypertension  Dyslipidemia  Prior tobacco use    PLAN:  Continue current CV plan of care to include low-dose aspirin, amlodipine, Lopressor, statin  Continue warfarin with goal INR 2.0-3.0  I will check surveillance labs and notify the patient for any outliers  Follow-up in 6 months or sooner if needed      Diagnoses and all orders for this visit:    1. Coronary artery disease involving native coronary artery of native heart without angina pectoris (Primary)  -     Lipid Panel; Future  -     CBC (No Diff); Future  -     Basic Metabolic Panel; Future    2. Hyperlipidemia, mixed  -     Lipid Panel; Future  -     CBC (No Diff); Future  -     Basic  Metabolic Panel; Future    3. Primary hypertension  -     Lipid Panel; Future  -     CBC (No Diff); Future  -     Basic Metabolic Panel; Future          The following portions of the patient's history were reviewed and updated as appropriate: allergies, current medications, past family history, past medical history, past social history, past surgical history, and problem list.    REVIEW OF SYSTEMS:    Review of Systems   All other systems reviewed and are negative.      Vitals:    03/12/24 1304   BP: 129/87   Pulse: 56   SpO2: 96%         PHYSICAL EXAM:    General: Alert, cooperative, no distress, appears stated age  Head:  Normocephalic, atraumatic, mucous membranes moist  Eyes:  Conjunctiva/corneas clear, EOM's intact     Neck:  Supple,  no JVD or bruit     Lungs: Clear to auscultation bilaterally, no wheezes rhonchi rales are noted  Chest wall: No tenderness  Musculoskeletal:   Ambulates freely without assistance  Heart::  Regular rate and rhythm, S1 and S2 normal, no murmur, rub or gallop  Abdomen: Soft, non-tender, nondistended, bowel sounds active, no abdominal bruit  Extremities: No cyanosis, clubbing, or edema   Pulses: 2+ and symmetric all extremities  Skin:  No rashes or lesions  Neuro/psych: A&O x3. CN II through XII are grossly intact with appropriate affect        Past Medical History:   Diagnosis Date    Chest pain 09/24/2018    Associated w/Nausea/SOB---Seen @ [facility]    Coronary heart disease 09/2018    Family history of heart disease     Mom/Dad/Uncles    History of EKG 09/24/18-[facility]    T-Wave Inversion in Inferior Leads    Hx of chest x-ray 09/24/18[facility]    WNL w/Possible Atelectasis or Pneumonia    Hyperlipidemia     Hypertension     Ischemic heart disease     NSTEMI (non-ST elevated myocardial infarction) 09/24/2018       Past Surgical History:   Procedure Laterality Date    CARDIAC CATHETERIZATION Left 09/4/18[facility]    w/Selective Coronary Arteriography/Intra-Aortic Balloon Pump Placement-  "Beatriz--See Report    LEG SURGERY      Right leg surgery steel plate-screws         Current Outpatient Medications:     amLODIPine (NORVASC) 10 MG tablet, TAKE ONE TABLET BY MOUTH DAILY, Disp: 90 tablet, Rfl: 1    aspirin (aspirin) 81 MG EC tablet, Daily., Disp: , Rfl:     atorvastatin (LIPITOR) 20 MG tablet, TAKE ONE TABLET BY MOUTH DAILY, Disp: 90 tablet, Rfl: 1    metoprolol tartrate (LOPRESSOR) 25 MG tablet, TAKE ONE TABLET BY MOUTH EVERY 12 HOURS (Patient taking differently: Daily.), Disp: 180 tablet, Rfl: 1    warfarin (COUMADIN) 5 MG tablet, TAKE 2 TABLETS BY MOUTH DAILY OR AS DIRECTED, Disp: 180 tablet, Rfl: 0    No Known Allergies    Family History   Problem Relation Age of Onset    Heart disease Mother     Heart disease Father     Heart disease Paternal Uncle        Social History     Tobacco Use    Smoking status: Former     Types: Cigarettes    Smokeless tobacco: Never   Substance Use Topics    Alcohol use: No           Current Electrocardiogram:    ECG 12 Lead    Date/Time: 3/12/2024 1:40 PM  Performed by: Roro Corley APRN    Authorized by: Roro Corley APRN  Comparison: compared with previous ECG from 9/12/2023  Similar to previous ECG  Rhythm: sinus bradycardia  BPM: 56  Conduction: 1st degree AV block              EMR Dragon/Transcription:   \"Dictated utilizing Dragon dictation\".     Copied text in this note has been reviewed by me and is accurate as of 03/12/24.    "

## 2024-03-26 ENCOUNTER — LAB (OUTPATIENT)
Dept: LAB | Facility: HOSPITAL | Age: 59
End: 2024-03-26
Payer: COMMERCIAL

## 2024-03-26 ENCOUNTER — ANTICOAGULATION VISIT (OUTPATIENT)
Dept: CARDIOLOGY | Facility: CLINIC | Age: 59
End: 2024-03-26
Payer: COMMERCIAL

## 2024-03-26 DIAGNOSIS — I25.41 ANEURYSM OF CORONARY VESSELS: Primary | ICD-10-CM

## 2024-03-26 DIAGNOSIS — I10 PRIMARY HYPERTENSION: ICD-10-CM

## 2024-03-26 DIAGNOSIS — I25.10 CORONARY ARTERY DISEASE INVOLVING NATIVE CORONARY ARTERY OF NATIVE HEART WITHOUT ANGINA PECTORIS: ICD-10-CM

## 2024-03-26 DIAGNOSIS — E78.2 HYPERLIPIDEMIA, MIXED: ICD-10-CM

## 2024-03-26 LAB
ANION GAP SERPL CALCULATED.3IONS-SCNC: 8.7 MMOL/L (ref 5–15)
BUN SERPL-MCNC: 13 MG/DL (ref 6–20)
BUN/CREAT SERPL: 13.7 (ref 7–25)
CALCIUM SPEC-SCNC: 9 MG/DL (ref 8.6–10.5)
CHLORIDE SERPL-SCNC: 104 MMOL/L (ref 98–107)
CHOLEST SERPL-MCNC: 140 MG/DL (ref 0–200)
CO2 SERPL-SCNC: 25.3 MMOL/L (ref 22–29)
CREAT SERPL-MCNC: 0.95 MG/DL (ref 0.76–1.27)
DEPRECATED RDW RBC AUTO: 46.3 FL (ref 37–54)
EGFRCR SERPLBLD CKD-EPI 2021: 92.8 ML/MIN/1.73
ERYTHROCYTE [DISTWIDTH] IN BLOOD BY AUTOMATED COUNT: 12.9 % (ref 12.3–15.4)
GLUCOSE SERPL-MCNC: 108 MG/DL (ref 65–99)
HCT VFR BLD AUTO: 42.5 % (ref 37.5–51)
HDLC SERPL-MCNC: 66 MG/DL (ref 40–60)
HGB BLD-MCNC: 13.7 G/DL (ref 13–17.7)
INR PPP: 1.7 (ref 0.9–1.1)
LDLC SERPL CALC-MCNC: 65 MG/DL (ref 0–100)
LDLC/HDLC SERPL: 1.01 {RATIO}
MCH RBC QN AUTO: 31.1 PG (ref 26.6–33)
MCHC RBC AUTO-ENTMCNC: 32.2 G/DL (ref 31.5–35.7)
MCV RBC AUTO: 96.4 FL (ref 79–97)
PLATELET # BLD AUTO: 335 10*3/MM3 (ref 140–450)
PMV BLD AUTO: 9.2 FL (ref 6–12)
POTASSIUM SERPL-SCNC: 4.6 MMOL/L (ref 3.5–5.2)
RBC # BLD AUTO: 4.41 10*6/MM3 (ref 4.14–5.8)
SODIUM SERPL-SCNC: 138 MMOL/L (ref 136–145)
TRIGL SERPL-MCNC: 38 MG/DL (ref 0–150)
VLDLC SERPL-MCNC: 9 MG/DL (ref 5–40)
WBC NRBC COR # BLD AUTO: 6.63 10*3/MM3 (ref 3.4–10.8)

## 2024-03-26 PROCEDURE — 80048 BASIC METABOLIC PNL TOTAL CA: CPT

## 2024-03-26 PROCEDURE — 36415 COLL VENOUS BLD VENIPUNCTURE: CPT

## 2024-03-26 PROCEDURE — 85027 COMPLETE CBC AUTOMATED: CPT

## 2024-03-26 PROCEDURE — 93793 ANTICOAG MGMT PT WARFARIN: CPT | Performed by: INTERNAL MEDICINE

## 2024-03-26 PROCEDURE — 80061 LIPID PANEL: CPT

## 2024-03-26 PROCEDURE — 85610 PROTHROMBIN TIME: CPT | Performed by: INTERNAL MEDICINE

## 2024-03-26 PROCEDURE — 36416 COLLJ CAPILLARY BLOOD SPEC: CPT | Performed by: INTERNAL MEDICINE

## 2024-04-01 DIAGNOSIS — I10 PRIMARY HYPERTENSION: ICD-10-CM

## 2024-04-01 RX ORDER — AMLODIPINE BESYLATE 10 MG/1
10 TABLET ORAL DAILY
Qty: 90 TABLET | Refills: 1 | Status: SHIPPED | OUTPATIENT
Start: 2024-04-01

## 2024-04-09 ENCOUNTER — ANTICOAGULATION VISIT (OUTPATIENT)
Dept: CARDIOLOGY | Facility: CLINIC | Age: 59
End: 2024-04-09
Payer: COMMERCIAL

## 2024-04-09 DIAGNOSIS — I25.41 ANEURYSM OF CORONARY VESSELS: Primary | ICD-10-CM

## 2024-04-09 LAB — INR PPP: 3.2 (ref 0.9–1.1)

## 2024-04-09 PROCEDURE — 36416 COLLJ CAPILLARY BLOOD SPEC: CPT | Performed by: INTERNAL MEDICINE

## 2024-04-09 PROCEDURE — 93793 ANTICOAG MGMT PT WARFARIN: CPT | Performed by: INTERNAL MEDICINE

## 2024-04-09 PROCEDURE — 85610 PROTHROMBIN TIME: CPT | Performed by: INTERNAL MEDICINE

## 2024-04-30 ENCOUNTER — ANTICOAGULATION VISIT (OUTPATIENT)
Dept: CARDIOLOGY | Facility: CLINIC | Age: 59
End: 2024-04-30
Payer: COMMERCIAL

## 2024-04-30 DIAGNOSIS — I25.41 ANEURYSM OF CORONARY VESSELS: Primary | ICD-10-CM

## 2024-04-30 LAB — INR PPP: 2.2 (ref 0.9–1.1)

## 2024-04-30 PROCEDURE — 36416 COLLJ CAPILLARY BLOOD SPEC: CPT | Performed by: INTERNAL MEDICINE

## 2024-04-30 PROCEDURE — 93793 ANTICOAG MGMT PT WARFARIN: CPT | Performed by: INTERNAL MEDICINE

## 2024-04-30 PROCEDURE — 85610 PROTHROMBIN TIME: CPT | Performed by: INTERNAL MEDICINE

## 2024-05-28 ENCOUNTER — ANTICOAGULATION VISIT (OUTPATIENT)
Dept: CARDIOLOGY | Facility: CLINIC | Age: 59
End: 2024-05-28
Payer: COMMERCIAL

## 2024-05-28 DIAGNOSIS — I25.41 ANEURYSM OF CORONARY VESSELS: Primary | ICD-10-CM

## 2024-05-28 LAB — INR PPP: 2.6 (ref 0.9–1.1)

## 2024-05-28 PROCEDURE — 36416 COLLJ CAPILLARY BLOOD SPEC: CPT | Performed by: INTERNAL MEDICINE

## 2024-05-28 PROCEDURE — 85610 PROTHROMBIN TIME: CPT | Performed by: INTERNAL MEDICINE

## 2024-05-28 PROCEDURE — 93793 ANTICOAG MGMT PT WARFARIN: CPT | Performed by: INTERNAL MEDICINE

## 2024-05-30 RX ORDER — WARFARIN SODIUM 5 MG/1
TABLET ORAL
Qty: 180 TABLET | Refills: 0 | Status: SHIPPED | OUTPATIENT
Start: 2024-05-30

## 2024-06-25 ENCOUNTER — ANTICOAGULATION VISIT (OUTPATIENT)
Dept: CARDIOLOGY | Facility: CLINIC | Age: 59
End: 2024-06-25
Payer: COMMERCIAL

## 2024-06-25 DIAGNOSIS — I25.41 ANEURYSM OF CORONARY VESSELS: Primary | ICD-10-CM

## 2024-06-25 LAB — INR PPP: 2.7 (ref 0.9–1.1)

## 2024-06-25 PROCEDURE — 36416 COLLJ CAPILLARY BLOOD SPEC: CPT | Performed by: INTERNAL MEDICINE

## 2024-06-25 PROCEDURE — 85610 PROTHROMBIN TIME: CPT | Performed by: INTERNAL MEDICINE

## 2024-06-25 PROCEDURE — 93793 ANTICOAG MGMT PT WARFARIN: CPT | Performed by: INTERNAL MEDICINE

## 2024-07-23 ENCOUNTER — ANTICOAGULATION VISIT (OUTPATIENT)
Dept: CARDIOLOGY | Facility: CLINIC | Age: 59
End: 2024-07-23
Payer: COMMERCIAL

## 2024-07-23 DIAGNOSIS — I25.41 ANEURYSM OF CORONARY VESSELS: Primary | ICD-10-CM

## 2024-07-23 LAB — INR PPP: 2 (ref 0.9–1.1)

## 2024-07-23 PROCEDURE — 36416 COLLJ CAPILLARY BLOOD SPEC: CPT | Performed by: INTERNAL MEDICINE

## 2024-07-23 PROCEDURE — 93793 ANTICOAG MGMT PT WARFARIN: CPT | Performed by: INTERNAL MEDICINE

## 2024-07-23 PROCEDURE — 85610 PROTHROMBIN TIME: CPT | Performed by: INTERNAL MEDICINE

## 2024-08-20 ENCOUNTER — ANTICOAGULATION VISIT (OUTPATIENT)
Dept: CARDIOLOGY | Facility: CLINIC | Age: 59
End: 2024-08-20
Payer: COMMERCIAL

## 2024-08-20 DIAGNOSIS — I25.41 ANEURYSM OF CORONARY VESSELS: Primary | ICD-10-CM

## 2024-08-20 LAB — INR PPP: 2 (ref 0.9–1.1)

## 2024-08-20 PROCEDURE — 85610 PROTHROMBIN TIME: CPT | Performed by: INTERNAL MEDICINE

## 2024-08-20 PROCEDURE — 36416 COLLJ CAPILLARY BLOOD SPEC: CPT | Performed by: INTERNAL MEDICINE

## 2024-08-20 PROCEDURE — 93793 ANTICOAG MGMT PT WARFARIN: CPT | Performed by: INTERNAL MEDICINE

## 2024-09-04 RX ORDER — ATORVASTATIN CALCIUM 20 MG/1
20 TABLET, FILM COATED ORAL DAILY
Qty: 90 TABLET | Refills: 1 | Status: SHIPPED | OUTPATIENT
Start: 2024-09-04

## 2024-09-17 ENCOUNTER — ANTICOAGULATION VISIT (OUTPATIENT)
Dept: CARDIOLOGY | Facility: CLINIC | Age: 59
End: 2024-09-17
Payer: COMMERCIAL

## 2024-09-17 DIAGNOSIS — I25.41 ANEURYSM OF CORONARY VESSELS: Primary | ICD-10-CM

## 2024-09-17 LAB — INR PPP: 1.6 (ref 0.9–1.1)

## 2024-09-17 PROCEDURE — 36416 COLLJ CAPILLARY BLOOD SPEC: CPT | Performed by: INTERNAL MEDICINE

## 2024-09-17 PROCEDURE — 93793 ANTICOAG MGMT PT WARFARIN: CPT | Performed by: INTERNAL MEDICINE

## 2024-09-17 PROCEDURE — 85610 PROTHROMBIN TIME: CPT | Performed by: INTERNAL MEDICINE

## 2024-09-23 DIAGNOSIS — I10 PRIMARY HYPERTENSION: ICD-10-CM

## 2024-09-23 RX ORDER — AMLODIPINE BESYLATE 10 MG/1
10 TABLET ORAL DAILY
Qty: 90 TABLET | Refills: 1 | Status: SHIPPED | OUTPATIENT
Start: 2024-09-23

## 2024-09-23 RX ORDER — WARFARIN SODIUM 5 MG/1
TABLET ORAL
Qty: 180 TABLET | Refills: 0 | Status: SHIPPED | OUTPATIENT
Start: 2024-09-23

## 2024-10-01 ENCOUNTER — ANTICOAGULATION VISIT (OUTPATIENT)
Dept: CARDIOLOGY | Facility: CLINIC | Age: 59
End: 2024-10-01
Payer: COMMERCIAL

## 2024-10-01 DIAGNOSIS — I25.41 ANEURYSM OF CORONARY VESSELS: Primary | ICD-10-CM

## 2024-10-01 LAB — INR PPP: 2 (ref 0.9–1.1)

## 2024-10-01 PROCEDURE — 85610 PROTHROMBIN TIME: CPT | Performed by: INTERNAL MEDICINE

## 2024-10-01 PROCEDURE — 93793 ANTICOAG MGMT PT WARFARIN: CPT | Performed by: INTERNAL MEDICINE

## 2024-10-01 PROCEDURE — 36416 COLLJ CAPILLARY BLOOD SPEC: CPT | Performed by: INTERNAL MEDICINE

## 2024-10-22 ENCOUNTER — ANTICOAGULATION VISIT (OUTPATIENT)
Dept: CARDIOLOGY | Facility: CLINIC | Age: 59
End: 2024-10-22
Payer: COMMERCIAL

## 2024-10-22 DIAGNOSIS — I25.41 ANEURYSM OF CORONARY VESSELS: Primary | ICD-10-CM

## 2024-10-22 LAB — INR PPP: 3.2 (ref 0.9–1.1)

## 2024-10-22 PROCEDURE — 85610 PROTHROMBIN TIME: CPT | Performed by: INTERNAL MEDICINE

## 2024-10-22 PROCEDURE — 93793 ANTICOAG MGMT PT WARFARIN: CPT | Performed by: INTERNAL MEDICINE

## 2024-10-22 PROCEDURE — 36416 COLLJ CAPILLARY BLOOD SPEC: CPT | Performed by: INTERNAL MEDICINE

## 2024-11-12 ENCOUNTER — ANTICOAGULATION VISIT (OUTPATIENT)
Dept: CARDIOLOGY | Facility: CLINIC | Age: 59
End: 2024-11-12
Payer: COMMERCIAL

## 2024-11-12 DIAGNOSIS — I25.41 ANEURYSM OF CORONARY VESSELS: Primary | ICD-10-CM

## 2024-11-12 LAB — INR PPP: 2.6 (ref 0.9–1.1)

## 2024-11-12 PROCEDURE — 36416 COLLJ CAPILLARY BLOOD SPEC: CPT | Performed by: INTERNAL MEDICINE

## 2024-11-12 PROCEDURE — 85610 PROTHROMBIN TIME: CPT | Performed by: INTERNAL MEDICINE

## 2024-11-12 PROCEDURE — 93793 ANTICOAG MGMT PT WARFARIN: CPT | Performed by: INTERNAL MEDICINE

## 2024-12-10 ENCOUNTER — ANTICOAGULATION VISIT (OUTPATIENT)
Dept: CARDIOLOGY | Facility: CLINIC | Age: 59
End: 2024-12-10
Payer: COMMERCIAL

## 2024-12-10 DIAGNOSIS — I25.41 ANEURYSM OF CORONARY VESSELS: Primary | ICD-10-CM

## 2024-12-10 LAB — INR PPP: 2.7 (ref 0.9–1.1)

## 2024-12-10 PROCEDURE — 36416 COLLJ CAPILLARY BLOOD SPEC: CPT | Performed by: INTERNAL MEDICINE

## 2024-12-10 PROCEDURE — 85610 PROTHROMBIN TIME: CPT | Performed by: INTERNAL MEDICINE

## 2024-12-10 PROCEDURE — 93793 ANTICOAG MGMT PT WARFARIN: CPT | Performed by: INTERNAL MEDICINE

## 2024-12-23 RX ORDER — METOPROLOL TARTRATE 25 MG/1
TABLET, FILM COATED ORAL
Qty: 180 TABLET | Refills: 1 | Status: SHIPPED | OUTPATIENT
Start: 2024-12-23

## 2025-01-08 RX ORDER — WARFARIN SODIUM 5 MG/1
TABLET ORAL
Qty: 180 TABLET | Refills: 0 | Status: SHIPPED | OUTPATIENT
Start: 2025-01-08

## 2025-01-28 ENCOUNTER — ANTICOAGULATION VISIT (OUTPATIENT)
Dept: CARDIOLOGY | Facility: CLINIC | Age: 60
End: 2025-01-28
Payer: COMMERCIAL

## 2025-01-28 DIAGNOSIS — I25.41 ANEURYSM OF CORONARY VESSELS: Primary | ICD-10-CM

## 2025-01-28 LAB — INR PPP: 3.2 (ref 0.9–1.1)

## 2025-01-28 PROCEDURE — 93793 ANTICOAG MGMT PT WARFARIN: CPT | Performed by: INTERNAL MEDICINE

## 2025-01-28 PROCEDURE — 85610 PROTHROMBIN TIME: CPT | Performed by: INTERNAL MEDICINE

## 2025-01-28 PROCEDURE — 36416 COLLJ CAPILLARY BLOOD SPEC: CPT | Performed by: INTERNAL MEDICINE

## 2025-02-25 ENCOUNTER — ANTICOAGULATION VISIT (OUTPATIENT)
Dept: CARDIOLOGY | Facility: CLINIC | Age: 60
End: 2025-02-25
Payer: COMMERCIAL

## 2025-02-25 ENCOUNTER — OFFICE VISIT (OUTPATIENT)
Dept: CARDIOLOGY | Facility: CLINIC | Age: 60
End: 2025-02-25
Payer: COMMERCIAL

## 2025-02-25 VITALS
WEIGHT: 206 LBS | HEIGHT: 69 IN | DIASTOLIC BLOOD PRESSURE: 91 MMHG | BODY MASS INDEX: 30.51 KG/M2 | SYSTOLIC BLOOD PRESSURE: 132 MMHG | OXYGEN SATURATION: 95 % | HEART RATE: 61 BPM

## 2025-02-25 DIAGNOSIS — I25.10 CORONARY ARTERY DISEASE INVOLVING NATIVE CORONARY ARTERY OF NATIVE HEART WITHOUT ANGINA PECTORIS: ICD-10-CM

## 2025-02-25 DIAGNOSIS — I25.42 SPONTANEOUS DISSECTION OF CORONARY ARTERY: Primary | ICD-10-CM

## 2025-02-25 DIAGNOSIS — I25.41 ANEURYSM OF CORONARY VESSELS: Primary | ICD-10-CM

## 2025-02-25 DIAGNOSIS — I10 PRIMARY HYPERTENSION: ICD-10-CM

## 2025-02-25 DIAGNOSIS — I25.2 HISTORY OF MYOCARDIAL INFARCTION: ICD-10-CM

## 2025-02-25 LAB — INR PPP: 4.7 (ref 0.9–1.1)

## 2025-02-25 PROCEDURE — 93000 ELECTROCARDIOGRAM COMPLETE: CPT | Performed by: NURSE PRACTITIONER

## 2025-02-25 PROCEDURE — 99214 OFFICE O/P EST MOD 30 MIN: CPT | Performed by: NURSE PRACTITIONER

## 2025-02-25 PROCEDURE — 36416 COLLJ CAPILLARY BLOOD SPEC: CPT | Performed by: INTERNAL MEDICINE

## 2025-02-25 PROCEDURE — 85610 PROTHROMBIN TIME: CPT | Performed by: INTERNAL MEDICINE

## 2025-02-25 NOTE — PROGRESS NOTES
[facility] CARDIOLOGY      REASON FOR FOLLOW-UP:  History of non-ST elevation MI secondary to RCA dissection           Chief Complaint   Patient presents with    Heart Problem         Dear Patrick, Michael Shore MD        Heart Problem     It was my pleasure to see Robin Prado in the office today.  He is a 59-year-old  male with history of non ST-elevation myocardial infarction secondary to dissection of the RCA September 2018.  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  He was discharged home on warfarin therapy.  He has additional risk factors that include hypertension, dyslipidemia and prior tobacco use.  He presents today in follow-up for these diagnoses.    Today, Robin reports that he feels well from a cardiovascular standpoint. Specifically, he denies any chest pain, pressure, tightness or palpitations. He denies any shortness of breath at rest, dyspnea with exertion, orthopnea or PND. No dizziness or lightheadedness. He denies any abnormal bleeding. INRs checked through our Coumadin clinic. INR today was 4.7.  He denies any abnormal bleeding such as melena, hematochezia, epistaxis or hemoptysis.  He stated that when his INR becomes elevated he sometimes does experience nosebleeds.    Surveillance labs reviewed from 3/12/2024: BMP and CBC unremarkable, lipids within good values-HDL 66.      ASSESSMENT:  History of spontaneous dissection of the RCA  Non-ST elevation MI  Former EtOH abuse  Coagulopathy on warfarin  Dizziness  Hypertension  Dyslipidemia  Prior tobacco use    PLAN:  Continue current CV plan of care to include low-dose aspirin, amlodipine, BB, statin  Continue warfarin with goal INR 2.0-3.0  Surveillance labs per primary care  Risk factor modification including heart healthy diet, routine exercise as tolerated, healthy weight  I will check surveillance echo-last echocardiogram was 2018, the date of his non-STEMI  Follow-up in 12  months or sooner if needed        CHF Guideline Directed Medical Therapy  Beta Blocker:   ARNI/ACE/ARB:   SGLT 2 inhibitors:   Diuretics:   Aldosterone Antagonist:   Vasodilators & Nitrates:       Diagnoses and all orders for this visit:    1. Spontaneous dissection of coronary artery (Primary)  -     Adult Transthoracic Echo Complete w/ Color, Spectral and Contrast if necessary per protocol; Future    2. History of myocardial infarction  -     Adult Transthoracic Echo Complete w/ Color, Spectral and Contrast if necessary per protocol; Future    3. Primary hypertension  -     Adult Transthoracic Echo Complete w/ Color, Spectral and Contrast if necessary per protocol; Future    4. Coronary artery disease involving native coronary artery of native heart without angina pectoris  -     Adult Transthoracic Echo Complete w/ Color, Spectral and Contrast if necessary per protocol; Future          The following portions of the patient's history were reviewed and updated as appropriate: allergies, current medications, past family history, past medical history, past social history, past surgical history, and problem list.    REVIEW OF SYSTEMS:    Review of Systems   All other systems reviewed and are negative.      Vitals:    02/25/25 1307   BP: 132/91   Pulse: 61   SpO2: 95%         PHYSICAL EXAM:    General: Alert, cooperative, no distress, appears stated age  Head:  Normocephalic, atraumatic, mucous membranes moist  Eyes:  Conjunctiva/corneas clear, EOM's intact     Neck:  Supple,  no JVD or bruit     Lungs: Clear to auscultation bilaterally, no wheezes rhonchi rales are noted  Chest wall: No tenderness  Musculoskeletal:   Ambulates freely without assistance  Heart::  Regular rate and rhythm, S1 and S2 normal, no murmur, rub or gallop  Abdomen: Soft, non-tender, nondistended, bowel sounds active, no abdominal bruit  Extremities: No cyanosis, clubbing, or edema   Pulses: 2+ and symmetric all extremities  Skin:  No rashes or  lesions  Neuro/psych: A&O x3. CN II through XII are grossly intact with appropriate affect        Past Medical History:   Diagnosis Date    Chest pain 09/24/2018    Associated w/Nausea/SOB---Seen @ [facility]    Coronary heart disease 09/2018    Family history of heart disease     Mom/Dad/Uncles    History of EKG 09/24/18-[facility]    T-Wave Inversion in Inferior Leads    Hx of chest x-ray 09/24/18-[facility]    WNL w/Possible Atelectasis or Pneumonia    Hyperlipidemia     Hypertension     Ischemic heart disease     NSTEMI (non-ST elevated myocardial infarction) 09/24/2018       Past Surgical History:   Procedure Laterality Date    CARDIAC CATHETERIZATION Left 09/4/18-[facility]    w/Selective Coronary Arteriography/Intra-Aortic Balloon Pump Placement-Dr. Henderson--See Report    LEG SURGERY      Right leg surgery steel plate-screws         Current Outpatient Medications:     amLODIPine (NORVASC) 10 MG tablet, TAKE 1 TABLET BY MOUTH DAILY, Disp: 90 tablet, Rfl: 1    aspirin (aspirin) 81 MG EC tablet, Daily., Disp: , Rfl:     atorvastatin (LIPITOR) 20 MG tablet, TAKE 1 TABLET BY MOUTH DAILY, Disp: 90 tablet, Rfl: 1    metoprolol tartrate (LOPRESSOR) 25 MG tablet, TAKE ONE TABLET BY MOUTH EVERY 12 HOURS, Disp: 180 tablet, Rfl: 1    warfarin (COUMADIN) 5 MG tablet, TAKE 2 TABLETS BY MOUTH DAILY OR AS DIRECTED BY YOUR PRESCRIBER, Disp: 180 tablet, Rfl: 0    No Known Allergies    Family History   Problem Relation Age of Onset    Heart disease Mother     Heart disease Father     Heart disease Paternal Uncle        Social History     Tobacco Use    Smoking status: Former     Types: Cigarettes    Smokeless tobacco: Never   Substance Use Topics    Alcohol use: No           Current Electrocardiogram:    ECG 12 Lead    Date/Time: 2/25/2025 1:50 PM  Performed by: Roro Corley APRN    Authorized by: Roro Corley APRN  Comparison: compared with previous ECG from 3/12/2024  Similar to previous ECG  Rhythm: sinus rhythm  BPM:  "61  Conduction: incomplete left bundle branch block and 1st degree AV block              EMR Dragon/Transcription:   \"Dictated utilizing Dragon dictation\".     Copied text in this note has been reviewed by me and is accurate as of 02/25/25.    "

## 2025-03-03 RX ORDER — ATORVASTATIN CALCIUM 20 MG/1
20 TABLET, FILM COATED ORAL DAILY
Qty: 90 TABLET | Refills: 1 | Status: SHIPPED | OUTPATIENT
Start: 2025-03-03

## 2025-03-11 ENCOUNTER — ANTICOAGULATION VISIT (OUTPATIENT)
Dept: CARDIOLOGY | Facility: CLINIC | Age: 60
End: 2025-03-11
Payer: COMMERCIAL

## 2025-03-11 DIAGNOSIS — I25.41 ANEURYSM OF CORONARY VESSELS: Primary | ICD-10-CM

## 2025-03-11 LAB — INR PPP: 5.2 (ref 0.9–1.1)

## 2025-03-11 PROCEDURE — 36416 COLLJ CAPILLARY BLOOD SPEC: CPT | Performed by: INTERNAL MEDICINE

## 2025-03-11 PROCEDURE — 85610 PROTHROMBIN TIME: CPT | Performed by: INTERNAL MEDICINE

## 2025-03-11 PROCEDURE — 93793 ANTICOAG MGMT PT WARFARIN: CPT | Performed by: INTERNAL MEDICINE

## 2025-03-18 ENCOUNTER — ANTICOAGULATION VISIT (OUTPATIENT)
Dept: CARDIOLOGY | Facility: CLINIC | Age: 60
End: 2025-03-18
Payer: COMMERCIAL

## 2025-03-18 DIAGNOSIS — I25.41 ANEURYSM OF CORONARY VESSELS: Primary | ICD-10-CM

## 2025-03-18 LAB — INR PPP: 3.7 (ref 0.9–1.1)

## 2025-03-18 PROCEDURE — 85610 PROTHROMBIN TIME: CPT | Performed by: INTERNAL MEDICINE

## 2025-03-18 PROCEDURE — 36416 COLLJ CAPILLARY BLOOD SPEC: CPT | Performed by: INTERNAL MEDICINE

## 2025-03-18 PROCEDURE — 93793 ANTICOAG MGMT PT WARFARIN: CPT | Performed by: INTERNAL MEDICINE

## 2025-03-25 ENCOUNTER — ANTICOAGULATION VISIT (OUTPATIENT)
Dept: CARDIOLOGY | Facility: CLINIC | Age: 60
End: 2025-03-25
Payer: COMMERCIAL

## 2025-03-25 DIAGNOSIS — I25.41 ANEURYSM OF CORONARY VESSELS: Primary | ICD-10-CM

## 2025-03-25 LAB — INR PPP: 2.8 (ref 0.9–1.1)

## 2025-03-25 PROCEDURE — 36416 COLLJ CAPILLARY BLOOD SPEC: CPT | Performed by: INTERNAL MEDICINE

## 2025-03-25 PROCEDURE — 93793 ANTICOAG MGMT PT WARFARIN: CPT | Performed by: INTERNAL MEDICINE

## 2025-03-25 PROCEDURE — 85610 PROTHROMBIN TIME: CPT | Performed by: INTERNAL MEDICINE

## 2025-04-04 DIAGNOSIS — I10 PRIMARY HYPERTENSION: ICD-10-CM

## 2025-04-04 RX ORDER — AMLODIPINE BESYLATE 10 MG/1
10 TABLET ORAL DAILY
Qty: 90 TABLET | Refills: 1 | Status: SHIPPED | OUTPATIENT
Start: 2025-04-04

## 2025-04-08 ENCOUNTER — ANTICOAGULATION VISIT (OUTPATIENT)
Dept: CARDIOLOGY | Facility: CLINIC | Age: 60
End: 2025-04-08
Payer: COMMERCIAL

## 2025-04-08 DIAGNOSIS — I25.41 ANEURYSM OF CORONARY VESSELS: Primary | ICD-10-CM

## 2025-04-08 LAB — INR PPP: 2.4 (ref 0.9–1.1)

## 2025-04-08 PROCEDURE — 36416 COLLJ CAPILLARY BLOOD SPEC: CPT | Performed by: INTERNAL MEDICINE

## 2025-04-08 PROCEDURE — 85610 PROTHROMBIN TIME: CPT | Performed by: INTERNAL MEDICINE

## 2025-04-08 PROCEDURE — 93793 ANTICOAG MGMT PT WARFARIN: CPT | Performed by: INTERNAL MEDICINE

## 2025-04-22 ENCOUNTER — ANTICOAGULATION VISIT (OUTPATIENT)
Dept: CARDIOLOGY | Facility: CLINIC | Age: 60
End: 2025-04-22
Payer: COMMERCIAL

## 2025-04-22 DIAGNOSIS — I25.41 ANEURYSM OF CORONARY VESSELS: Primary | ICD-10-CM

## 2025-04-22 LAB — INR PPP: 1.9 (ref 0.9–1.1)

## 2025-04-22 PROCEDURE — 93793 ANTICOAG MGMT PT WARFARIN: CPT | Performed by: INTERNAL MEDICINE

## 2025-04-22 PROCEDURE — 85610 PROTHROMBIN TIME: CPT | Performed by: INTERNAL MEDICINE

## 2025-04-22 PROCEDURE — 36416 COLLJ CAPILLARY BLOOD SPEC: CPT | Performed by: INTERNAL MEDICINE

## 2025-05-06 ENCOUNTER — ON CAMPUS - OUTPATIENT (AMBULATORY)
Dept: URBAN - METROPOLITAN AREA HOSPITAL 2 | Facility: HOSPITAL | Age: 60
End: 2025-05-06
Payer: COMMERCIAL

## 2025-05-06 ENCOUNTER — OFFICE (AMBULATORY)
Dept: URBAN - METROPOLITAN AREA PATHOLOGY 19 | Facility: PATHOLOGY | Age: 60
End: 2025-05-06
Payer: COMMERCIAL

## 2025-05-06 VITALS
DIASTOLIC BLOOD PRESSURE: 88 MMHG | OXYGEN SATURATION: 98 % | DIASTOLIC BLOOD PRESSURE: 67 MMHG | RESPIRATION RATE: 14 BRPM | TEMPERATURE: 97.7 F | DIASTOLIC BLOOD PRESSURE: 65 MMHG | HEART RATE: 59 BPM | SYSTOLIC BLOOD PRESSURE: 121 MMHG | HEART RATE: 53 BPM | HEART RATE: 66 BPM | DIASTOLIC BLOOD PRESSURE: 55 MMHG | SYSTOLIC BLOOD PRESSURE: 104 MMHG | OXYGEN SATURATION: 95 % | HEART RATE: 63 BPM | DIASTOLIC BLOOD PRESSURE: 91 MMHG | SYSTOLIC BLOOD PRESSURE: 90 MMHG | HEART RATE: 74 BPM | DIASTOLIC BLOOD PRESSURE: 58 MMHG | SYSTOLIC BLOOD PRESSURE: 125 MMHG | SYSTOLIC BLOOD PRESSURE: 109 MMHG | SYSTOLIC BLOOD PRESSURE: 91 MMHG | OXYGEN SATURATION: 99 % | OXYGEN SATURATION: 92 % | RESPIRATION RATE: 18 BRPM | RESPIRATION RATE: 16 BRPM | HEIGHT: 70 IN | WEIGHT: 201 LBS | OXYGEN SATURATION: 97 % | RESPIRATION RATE: 15 BRPM | RESPIRATION RATE: 20 BRPM | HEART RATE: 54 BPM | SYSTOLIC BLOOD PRESSURE: 97 MMHG | DIASTOLIC BLOOD PRESSURE: 76 MMHG

## 2025-05-06 DIAGNOSIS — D12.4 BENIGN NEOPLASM OF DESCENDING COLON: ICD-10-CM

## 2025-05-06 DIAGNOSIS — Z83.719 FAMILY HISTORY OF COLON POLYPS, UNSPECIFIED: ICD-10-CM

## 2025-05-06 DIAGNOSIS — D12.5 BENIGN NEOPLASM OF SIGMOID COLON: ICD-10-CM

## 2025-05-06 DIAGNOSIS — Z12.11 ENCOUNTER FOR SCREENING FOR MALIGNANT NEOPLASM OF COLON: ICD-10-CM

## 2025-05-06 DIAGNOSIS — K57.30 DIVERTICULOSIS OF LARGE INTESTINE WITHOUT PERFORATION OR ABS: ICD-10-CM

## 2025-05-06 DIAGNOSIS — K64.0 FIRST DEGREE HEMORRHOIDS: ICD-10-CM

## 2025-05-06 PROBLEM — K63.5 POLYP OF COLON: Status: ACTIVE | Noted: 2025-05-06

## 2025-05-06 PROCEDURE — 88305 TISSUE EXAM BY PATHOLOGIST: CPT | Performed by: PATHOLOGY

## 2025-05-06 PROCEDURE — 45385 COLONOSCOPY W/LESION REMOVAL: CPT | Mod: 33 | Performed by: INTERNAL MEDICINE

## 2025-05-13 ENCOUNTER — ANTICOAGULATION VISIT (OUTPATIENT)
Dept: CARDIOLOGY | Facility: CLINIC | Age: 60
End: 2025-05-13
Payer: COMMERCIAL

## 2025-05-13 DIAGNOSIS — I25.41 ANEURYSM OF CORONARY VESSELS: Primary | ICD-10-CM

## 2025-05-13 LAB — INR PPP: 1.6 (ref 0.9–1.1)

## 2025-05-13 PROCEDURE — 93793 ANTICOAG MGMT PT WARFARIN: CPT | Performed by: INTERNAL MEDICINE

## 2025-05-13 PROCEDURE — 85610 PROTHROMBIN TIME: CPT | Performed by: INTERNAL MEDICINE

## 2025-05-13 PROCEDURE — 36416 COLLJ CAPILLARY BLOOD SPEC: CPT | Performed by: INTERNAL MEDICINE

## 2025-06-02 RX ORDER — WARFARIN SODIUM 5 MG/1
TABLET ORAL
Qty: 180 TABLET | Refills: 1 | Status: SHIPPED | OUTPATIENT
Start: 2025-06-02

## 2025-06-03 ENCOUNTER — ANTICOAGULATION VISIT (OUTPATIENT)
Dept: CARDIOLOGY | Facility: CLINIC | Age: 60
End: 2025-06-03
Payer: COMMERCIAL

## 2025-06-03 DIAGNOSIS — I25.41 ANEURYSM OF CORONARY VESSELS: Primary | ICD-10-CM

## 2025-06-03 LAB — INR PPP: 1.9 (ref 0.9–1.1)

## 2025-06-03 PROCEDURE — 93793 ANTICOAG MGMT PT WARFARIN: CPT | Performed by: INTERNAL MEDICINE

## 2025-06-03 PROCEDURE — 36416 COLLJ CAPILLARY BLOOD SPEC: CPT | Performed by: INTERNAL MEDICINE

## 2025-06-03 PROCEDURE — 85610 PROTHROMBIN TIME: CPT | Performed by: INTERNAL MEDICINE

## 2025-06-24 ENCOUNTER — ANTICOAGULATION VISIT (OUTPATIENT)
Dept: CARDIOLOGY | Facility: CLINIC | Age: 60
End: 2025-06-24
Payer: COMMERCIAL

## 2025-06-24 DIAGNOSIS — I25.41 ANEURYSM OF CORONARY VESSELS: Primary | ICD-10-CM

## 2025-06-24 LAB — INR PPP: 2.2 (ref 0.9–1.1)

## 2025-06-24 PROCEDURE — 93793 ANTICOAG MGMT PT WARFARIN: CPT | Performed by: INTERNAL MEDICINE

## 2025-06-24 PROCEDURE — 36416 COLLJ CAPILLARY BLOOD SPEC: CPT | Performed by: INTERNAL MEDICINE

## 2025-06-24 PROCEDURE — 85610 PROTHROMBIN TIME: CPT | Performed by: INTERNAL MEDICINE

## 2025-07-22 ENCOUNTER — ANTICOAGULATION VISIT (OUTPATIENT)
Dept: CARDIOLOGY | Facility: CLINIC | Age: 60
End: 2025-07-22
Payer: COMMERCIAL

## 2025-07-22 DIAGNOSIS — I25.41 ANEURYSM OF CORONARY VESSELS: Primary | ICD-10-CM

## 2025-07-22 LAB — INR PPP: 2.5 (ref 0.9–1.1)

## 2025-07-22 PROCEDURE — 93793 ANTICOAG MGMT PT WARFARIN: CPT | Performed by: INTERNAL MEDICINE

## 2025-07-22 PROCEDURE — 85610 PROTHROMBIN TIME: CPT | Performed by: INTERNAL MEDICINE

## 2025-07-22 PROCEDURE — 36416 COLLJ CAPILLARY BLOOD SPEC: CPT | Performed by: INTERNAL MEDICINE

## 2025-08-19 ENCOUNTER — ANTICOAGULATION VISIT (OUTPATIENT)
Dept: CARDIOLOGY | Facility: CLINIC | Age: 60
End: 2025-08-19
Payer: COMMERCIAL

## 2025-08-19 DIAGNOSIS — I25.41 ANEURYSM OF CORONARY VESSELS: Primary | ICD-10-CM

## 2025-08-19 LAB — INR PPP: 2.4 (ref 0.9–1.1)

## 2025-08-19 PROCEDURE — 93793 ANTICOAG MGMT PT WARFARIN: CPT | Performed by: INTERNAL MEDICINE

## 2025-08-19 PROCEDURE — 85610 PROTHROMBIN TIME: CPT | Performed by: INTERNAL MEDICINE

## 2025-08-19 PROCEDURE — 36416 COLLJ CAPILLARY BLOOD SPEC: CPT | Performed by: INTERNAL MEDICINE
